# Patient Record
Sex: FEMALE | Race: OTHER | NOT HISPANIC OR LATINO | URBAN - METROPOLITAN AREA
[De-identification: names, ages, dates, MRNs, and addresses within clinical notes are randomized per-mention and may not be internally consistent; named-entity substitution may affect disease eponyms.]

---

## 2017-06-23 ENCOUNTER — OUTPATIENT (OUTPATIENT)
Dept: OUTPATIENT SERVICES | Facility: HOSPITAL | Age: 37
LOS: 1 days | Discharge: HOME | End: 2017-06-23
Payer: MEDICAID

## 2017-06-23 ENCOUNTER — RESULT REVIEW (OUTPATIENT)
Age: 37
End: 2017-06-23

## 2017-06-23 VITALS
HEART RATE: 91 BPM | TEMPERATURE: 98 F | SYSTOLIC BLOOD PRESSURE: 130 MMHG | HEIGHT: 64 IN | OXYGEN SATURATION: 96 % | DIASTOLIC BLOOD PRESSURE: 74 MMHG | RESPIRATION RATE: 21 BRPM | WEIGHT: 233.03 LBS

## 2017-06-23 LAB — HCG UR QL: NEGATIVE — SIGNIFICANT CHANGE UP

## 2017-06-23 PROCEDURE — 88312 SPECIAL STAINS GROUP 1: CPT | Mod: 26

## 2017-06-23 PROCEDURE — 88305 TISSUE EXAM BY PATHOLOGIST: CPT | Mod: 26

## 2017-06-23 RX ORDER — SODIUM CHLORIDE 9 MG/ML
1000 INJECTION INTRAMUSCULAR; INTRAVENOUS; SUBCUTANEOUS
Qty: 0 | Refills: 0 | Status: DISCONTINUED | OUTPATIENT
Start: 2017-06-23 | End: 2017-07-08

## 2017-06-23 RX ADMIN — SODIUM CHLORIDE 75 MILLILITER(S): 9 INJECTION INTRAMUSCULAR; INTRAVENOUS; SUBCUTANEOUS at 14:31

## 2017-06-28 LAB — SURGICAL PATHOLOGY FINAL REPORT - CH: SIGNIFICANT CHANGE UP

## 2017-06-29 DIAGNOSIS — K29.50 UNSPECIFIED CHRONIC GASTRITIS WITHOUT BLEEDING: ICD-10-CM

## 2017-06-29 DIAGNOSIS — E66.01 MORBID (SEVERE) OBESITY DUE TO EXCESS CALORIES: ICD-10-CM

## 2017-06-29 DIAGNOSIS — B96.81 HELICOBACTER PYLORI [H. PYLORI] AS THE CAUSE OF DISEASES CLASSIFIED ELSEWHERE: ICD-10-CM

## 2017-06-29 DIAGNOSIS — Z01.818 ENCOUNTER FOR OTHER PREPROCEDURAL EXAMINATION: ICD-10-CM

## 2017-06-29 DIAGNOSIS — Z87.891 PERSONAL HISTORY OF NICOTINE DEPENDENCE: ICD-10-CM

## 2017-08-24 ENCOUNTER — OUTPATIENT (OUTPATIENT)
Dept: OUTPATIENT SERVICES | Facility: HOSPITAL | Age: 37
LOS: 1 days | Discharge: HOME | End: 2017-08-24

## 2017-08-24 DIAGNOSIS — B96.81 HELICOBACTER PYLORI [H. PYLORI] AS THE CAUSE OF DISEASES CLASSIFIED ELSEWHERE: ICD-10-CM

## 2017-08-24 DIAGNOSIS — K91.2 POSTSURGICAL MALABSORPTION, NOT ELSEWHERE CLASSIFIED: ICD-10-CM

## 2017-08-24 DIAGNOSIS — E56.0 DEFICIENCY OF VITAMIN E: ICD-10-CM

## 2017-08-24 DIAGNOSIS — E55.9 VITAMIN D DEFICIENCY, UNSPECIFIED: ICD-10-CM

## 2017-08-24 DIAGNOSIS — D50.8 OTHER IRON DEFICIENCY ANEMIAS: ICD-10-CM

## 2017-08-24 DIAGNOSIS — R82.90 UNSPECIFIED ABNORMAL FINDINGS IN URINE: ICD-10-CM

## 2017-08-24 DIAGNOSIS — D53.9 NUTRITIONAL ANEMIA, UNSPECIFIED: ICD-10-CM

## 2017-08-24 DIAGNOSIS — E03.9 HYPOTHYROIDISM, UNSPECIFIED: ICD-10-CM

## 2017-08-24 DIAGNOSIS — E11.65 TYPE 2 DIABETES MELLITUS WITH HYPERGLYCEMIA: ICD-10-CM

## 2017-08-24 DIAGNOSIS — K21.9 GASTRO-ESOPHAGEAL REFLUX DISEASE WITHOUT ESOPHAGITIS: ICD-10-CM

## 2017-08-24 DIAGNOSIS — E66.01 MORBID (SEVERE) OBESITY DUE TO EXCESS CALORIES: ICD-10-CM

## 2017-08-24 DIAGNOSIS — E56.9 VITAMIN DEFICIENCY, UNSPECIFIED: ICD-10-CM

## 2017-08-24 DIAGNOSIS — E86.0 DEHYDRATION: ICD-10-CM

## 2017-08-24 DIAGNOSIS — Z98.84 BARIATRIC SURGERY STATUS: ICD-10-CM

## 2017-08-24 DIAGNOSIS — E78.5 HYPERLIPIDEMIA, UNSPECIFIED: ICD-10-CM

## 2017-08-24 DIAGNOSIS — E50.9 VITAMIN A DEFICIENCY, UNSPECIFIED: ICD-10-CM

## 2017-08-24 DIAGNOSIS — E56.1 DEFICIENCY OF VITAMIN K: ICD-10-CM

## 2017-08-24 DIAGNOSIS — D51.9 VITAMIN B12 DEFICIENCY ANEMIA, UNSPECIFIED: ICD-10-CM

## 2017-08-24 DIAGNOSIS — E21.3 HYPERPARATHYROIDISM, UNSPECIFIED: ICD-10-CM

## 2017-08-24 LAB
ALBUMIN SERPL ELPH-MCNC: 3.5 G/DL — SIGNIFICANT CHANGE UP (ref 3.3–5)
ALP SERPL-CCNC: 68 U/L — SIGNIFICANT CHANGE UP (ref 40–120)
ALT FLD-CCNC: 21 U/L — SIGNIFICANT CHANGE UP (ref 12–78)
ANION GAP SERPL CALC-SCNC: 6 MMOL/L — SIGNIFICANT CHANGE UP (ref 5–17)
APPEARANCE UR: CLEAR — SIGNIFICANT CHANGE UP
APTT BLD: 32.5 SEC — SIGNIFICANT CHANGE UP (ref 27.5–37.4)
AST SERPL-CCNC: 16 U/L — SIGNIFICANT CHANGE UP (ref 15–37)
BILIRUB DIRECT SERPL-MCNC: 0.2 MG/DL — SIGNIFICANT CHANGE UP (ref 0–0.2)
BILIRUB INDIRECT FLD-MCNC: 0.4 MG/DL — SIGNIFICANT CHANGE UP (ref 0.2–1)
BILIRUB SERPL-MCNC: 0.6 MG/DL — SIGNIFICANT CHANGE UP (ref 0.2–1.2)
BILIRUB UR-MCNC: NEGATIVE — SIGNIFICANT CHANGE UP
BUN SERPL-MCNC: 12 MG/DL — SIGNIFICANT CHANGE UP (ref 7–23)
CALCIUM SERPL-MCNC: 8.8 MG/DL — SIGNIFICANT CHANGE UP (ref 8.5–10.1)
CHLORIDE SERPL-SCNC: 108 MMOL/L — SIGNIFICANT CHANGE UP (ref 96–108)
CO2 SERPL-SCNC: 27 MMOL/L — SIGNIFICANT CHANGE UP (ref 22–31)
COHGB MFR BLDV: 1.6 % — HIGH (ref 0–1.5)
COLOR SPEC: YELLOW — SIGNIFICANT CHANGE UP
CREAT SERPL-MCNC: 0.69 MG/DL — SIGNIFICANT CHANGE UP (ref 0.5–1.3)
DIFF PNL FLD: NEGATIVE — SIGNIFICANT CHANGE UP
FERRITIN SERPL-MCNC: 29 NG/ML — SIGNIFICANT CHANGE UP (ref 15–150)
FOLATE SERPL-MCNC: 9.4 NG/ML — SIGNIFICANT CHANGE UP (ref 4.8–24.2)
GLUCOSE SERPL-MCNC: 86 MG/DL — SIGNIFICANT CHANGE UP (ref 70–99)
GLUCOSE UR QL: NEGATIVE MG/DL — SIGNIFICANT CHANGE UP
HBA1C BLD-MCNC: 5.4 % — SIGNIFICANT CHANGE UP (ref 4–5.6)
HCT VFR BLD CALC: 38.2 % — SIGNIFICANT CHANGE UP (ref 34.5–45)
HGB BLD-MCNC: 13.1 G/DL — SIGNIFICANT CHANGE UP (ref 11.5–15.5)
INR BLD: 0.99 RATIO — SIGNIFICANT CHANGE UP (ref 0.88–1.16)
KETONES UR-MCNC: NEGATIVE — SIGNIFICANT CHANGE UP
LEUKOCYTE ESTERASE UR-ACNC: NEGATIVE — SIGNIFICANT CHANGE UP
MAGNESIUM SERPL-MCNC: 2.2 MG/DL — SIGNIFICANT CHANGE UP (ref 1.6–2.6)
MCHC RBC-ENTMCNC: 29.5 PG — SIGNIFICANT CHANGE UP (ref 27–34)
MCHC RBC-ENTMCNC: 34.3 GM/DL — SIGNIFICANT CHANGE UP (ref 32–36)
MCV RBC AUTO: 86 FL — SIGNIFICANT CHANGE UP (ref 80–100)
NITRITE UR-MCNC: NEGATIVE — SIGNIFICANT CHANGE UP
PH UR: 7 — SIGNIFICANT CHANGE UP (ref 5–8)
PHOSPHATE SERPL-MCNC: 2.6 MG/DL — SIGNIFICANT CHANGE UP (ref 2.5–4.5)
PLATELET # BLD AUTO: 224 K/UL — SIGNIFICANT CHANGE UP (ref 150–400)
POTASSIUM SERPL-MCNC: 4 MMOL/L — SIGNIFICANT CHANGE UP (ref 3.5–5.3)
POTASSIUM SERPL-SCNC: 4 MMOL/L — SIGNIFICANT CHANGE UP (ref 3.5–5.3)
PROT SERPL-MCNC: 6.7 GM/DL — SIGNIFICANT CHANGE UP (ref 6–8.3)
PROT UR-MCNC: NEGATIVE MG/DL — SIGNIFICANT CHANGE UP
PROTHROM AB SERPL-ACNC: 10.7 SEC — SIGNIFICANT CHANGE UP (ref 9.8–12.7)
RBC # BLD: 4.45 M/UL — SIGNIFICANT CHANGE UP (ref 3.8–5.2)
RBC # FLD: 11.8 % — SIGNIFICANT CHANGE UP (ref 10.3–14.5)
SODIUM SERPL-SCNC: 141 MMOL/L — SIGNIFICANT CHANGE UP (ref 135–145)
SP GR SPEC: 1.01 — SIGNIFICANT CHANGE UP (ref 1.01–1.02)
T3 SERPL-MCNC: 147 NG/DL — SIGNIFICANT CHANGE UP (ref 80–200)
T4 AB SER-ACNC: 8.5 UG/DL — SIGNIFICANT CHANGE UP (ref 4.6–12)
TSH SERPL-MCNC: 1.06 UU/ML — SIGNIFICANT CHANGE UP (ref 0.36–3.74)
UROBILINOGEN FLD QL: 1 MG/DL
VIT B12 SERPL-MCNC: 619 PG/ML — SIGNIFICANT CHANGE UP (ref 243–894)
WBC # BLD: 7.6 K/UL — SIGNIFICANT CHANGE UP (ref 3.8–10.5)
WBC # FLD AUTO: 7.6 K/UL — SIGNIFICANT CHANGE UP (ref 3.8–10.5)

## 2017-08-25 LAB
24R-OH-CALCIDIOL SERPL-MCNC: 16.4 NG/ML — LOW (ref 30–100)
CHOLEST SERPL-MCNC: 150 MG/DL — SIGNIFICANT CHANGE UP (ref 10–199)
HDLC SERPL-MCNC: 42 MG/DL — SIGNIFICANT CHANGE UP (ref 40–125)
IRON SATN MFR SERPL: 16 % — SIGNIFICANT CHANGE UP (ref 14–50)
IRON SATN MFR SERPL: 54 UG/DL — SIGNIFICANT CHANGE UP (ref 30–160)
LIPID PNL WITH DIRECT LDL SERPL: 95 MG/DL — SIGNIFICANT CHANGE UP
TIBC SERPL-MCNC: 329 UG/DL — SIGNIFICANT CHANGE UP (ref 220–430)
TOTAL CHOLESTEROL/HDL RATIO MEASUREMENT: 3.6 RATIO — SIGNIFICANT CHANGE UP (ref 3.3–7.1)
TRIGL SERPL-MCNC: 65 MG/DL — SIGNIFICANT CHANGE UP (ref 10–149)
UIBC SERPL-MCNC: 275 UG/DL — SIGNIFICANT CHANGE UP (ref 110–370)

## 2017-08-27 LAB — VIT B1 SERPL-MCNC: 123.7 NMOL/L — SIGNIFICANT CHANGE UP (ref 66.5–200)

## 2017-09-26 ENCOUNTER — EMERGENCY (EMERGENCY)
Facility: HOSPITAL | Age: 37
LOS: 0 days | Discharge: HOME | End: 2017-09-26
Attending: EMERGENCY MEDICINE | Admitting: EMERGENCY MEDICINE
Payer: COMMERCIAL

## 2017-09-26 ENCOUNTER — APPOINTMENT (OUTPATIENT)
Dept: INTERNAL MEDICINE | Facility: CLINIC | Age: 37
End: 2017-09-26

## 2017-09-26 VITALS
RESPIRATION RATE: 16 BRPM | TEMPERATURE: 98 F | SYSTOLIC BLOOD PRESSURE: 139 MMHG | OXYGEN SATURATION: 100 % | WEIGHT: 190.04 LBS | HEART RATE: 75 BPM | DIASTOLIC BLOOD PRESSURE: 77 MMHG | HEIGHT: 62 IN

## 2017-09-26 VITALS
OXYGEN SATURATION: 100 % | DIASTOLIC BLOOD PRESSURE: 53 MMHG | RESPIRATION RATE: 18 BRPM | SYSTOLIC BLOOD PRESSURE: 95 MMHG | HEART RATE: 62 BPM | TEMPERATURE: 99 F

## 2017-09-26 LAB
ALBUMIN SERPL ELPH-MCNC: 3.8 G/DL — SIGNIFICANT CHANGE UP (ref 3.3–5)
ALP SERPL-CCNC: 73 U/L — SIGNIFICANT CHANGE UP (ref 40–120)
ALT FLD-CCNC: 19 U/L — SIGNIFICANT CHANGE UP (ref 12–78)
ANION GAP SERPL CALC-SCNC: 7 MMOL/L — SIGNIFICANT CHANGE UP (ref 5–17)
APPEARANCE UR: CLEAR — SIGNIFICANT CHANGE UP
AST SERPL-CCNC: 11 U/L — LOW (ref 15–37)
BACTERIA # UR AUTO: (no result)
BASOPHILS # BLD AUTO: 0.1 K/UL — SIGNIFICANT CHANGE UP (ref 0–0.2)
BASOPHILS NFR BLD AUTO: 0.8 % — SIGNIFICANT CHANGE UP (ref 0–2)
BILIRUB SERPL-MCNC: 0.6 MG/DL — SIGNIFICANT CHANGE UP (ref 0.2–1.2)
BILIRUB UR-MCNC: NEGATIVE — SIGNIFICANT CHANGE UP
BUN SERPL-MCNC: 13 MG/DL — SIGNIFICANT CHANGE UP (ref 7–23)
CALCIUM SERPL-MCNC: 8.9 MG/DL — SIGNIFICANT CHANGE UP (ref 8.5–10.1)
CHLORIDE SERPL-SCNC: 105 MMOL/L — SIGNIFICANT CHANGE UP (ref 96–108)
CO2 SERPL-SCNC: 27 MMOL/L — SIGNIFICANT CHANGE UP (ref 22–31)
COLOR SPEC: YELLOW — SIGNIFICANT CHANGE UP
CREAT SERPL-MCNC: 0.78 MG/DL — SIGNIFICANT CHANGE UP (ref 0.5–1.3)
DIFF PNL FLD: NEGATIVE — SIGNIFICANT CHANGE UP
EOSINOPHIL # BLD AUTO: 0 K/UL — SIGNIFICANT CHANGE UP (ref 0–0.5)
EOSINOPHIL NFR BLD AUTO: 0.1 % — SIGNIFICANT CHANGE UP (ref 0–6)
EPI CELLS # UR: SIGNIFICANT CHANGE UP
GLUCOSE SERPL-MCNC: 116 MG/DL — HIGH (ref 70–99)
GLUCOSE UR QL: NEGATIVE MG/DL — SIGNIFICANT CHANGE UP
HCT VFR BLD CALC: 41.5 % — SIGNIFICANT CHANGE UP (ref 34.5–45)
HGB BLD-MCNC: 13.8 G/DL — SIGNIFICANT CHANGE UP (ref 11.5–15.5)
KETONES UR-MCNC: (no result)
LEUKOCYTE ESTERASE UR-ACNC: (no result)
LIDOCAIN IGE QN: 82 U/L — SIGNIFICANT CHANGE UP (ref 73–393)
LYMPHOCYTES # BLD AUTO: 1.7 K/UL — SIGNIFICANT CHANGE UP (ref 1–3.3)
LYMPHOCYTES # BLD AUTO: 21.4 % — SIGNIFICANT CHANGE UP (ref 13–44)
MCHC RBC-ENTMCNC: 29.1 PG — SIGNIFICANT CHANGE UP (ref 27–34)
MCHC RBC-ENTMCNC: 33.2 GM/DL — SIGNIFICANT CHANGE UP (ref 32–36)
MCV RBC AUTO: 87.5 FL — SIGNIFICANT CHANGE UP (ref 80–100)
MONOCYTES # BLD AUTO: 0.7 K/UL — SIGNIFICANT CHANGE UP (ref 0–0.9)
MONOCYTES NFR BLD AUTO: 9 % — SIGNIFICANT CHANGE UP (ref 2–14)
NEUTROPHILS # BLD AUTO: 5.5 K/UL — SIGNIFICANT CHANGE UP (ref 1.8–7.4)
NEUTROPHILS NFR BLD AUTO: 68.7 % — SIGNIFICANT CHANGE UP (ref 43–77)
NITRITE UR-MCNC: NEGATIVE — SIGNIFICANT CHANGE UP
PH UR: 8 — SIGNIFICANT CHANGE UP (ref 5–8)
PLATELET # BLD AUTO: 259 K/UL — SIGNIFICANT CHANGE UP (ref 150–400)
POTASSIUM SERPL-MCNC: 3.7 MMOL/L — SIGNIFICANT CHANGE UP (ref 3.5–5.3)
POTASSIUM SERPL-SCNC: 3.7 MMOL/L — SIGNIFICANT CHANGE UP (ref 3.5–5.3)
PROT SERPL-MCNC: 7.3 GM/DL — SIGNIFICANT CHANGE UP (ref 6–8.3)
PROT UR-MCNC: 15 MG/DL
RBC # BLD: 4.74 M/UL — SIGNIFICANT CHANGE UP (ref 3.8–5.2)
RBC # FLD: 12.3 % — SIGNIFICANT CHANGE UP (ref 10.3–14.5)
RBC CASTS # UR COMP ASSIST: SIGNIFICANT CHANGE UP /HPF (ref 0–4)
SODIUM SERPL-SCNC: 139 MMOL/L — SIGNIFICANT CHANGE UP (ref 135–145)
SP GR SPEC: 1.01 — SIGNIFICANT CHANGE UP (ref 1.01–1.02)
UROBILINOGEN FLD QL: NEGATIVE MG/DL — SIGNIFICANT CHANGE UP
WBC # BLD: 8 K/UL — SIGNIFICANT CHANGE UP (ref 3.8–10.5)
WBC # FLD AUTO: 8 K/UL — SIGNIFICANT CHANGE UP (ref 3.8–10.5)
WBC UR QL: SIGNIFICANT CHANGE UP

## 2017-09-26 PROCEDURE — 99285 EMERGENCY DEPT VISIT HI MDM: CPT | Mod: 25

## 2017-09-26 PROCEDURE — 74177 CT ABD & PELVIS W/CONTRAST: CPT | Mod: 26

## 2017-09-26 RX ORDER — MORPHINE SULFATE 50 MG/1
4 CAPSULE, EXTENDED RELEASE ORAL ONCE
Qty: 0 | Refills: 0 | Status: DISCONTINUED | OUTPATIENT
Start: 2017-09-26 | End: 2017-09-26

## 2017-09-26 RX ORDER — SODIUM CHLORIDE 9 MG/ML
1000 INJECTION INTRAMUSCULAR; INTRAVENOUS; SUBCUTANEOUS ONCE
Qty: 0 | Refills: 0 | Status: COMPLETED | OUTPATIENT
Start: 2017-09-26 | End: 2017-09-26

## 2017-09-26 RX ORDER — ONDANSETRON 8 MG/1
4 TABLET, FILM COATED ORAL ONCE
Qty: 0 | Refills: 0 | Status: COMPLETED | OUTPATIENT
Start: 2017-09-26 | End: 2017-09-26

## 2017-09-26 RX ADMIN — ONDANSETRON 4 MILLIGRAM(S): 8 TABLET, FILM COATED ORAL at 11:13

## 2017-09-26 RX ADMIN — SODIUM CHLORIDE 1000 MILLILITER(S): 9 INJECTION INTRAMUSCULAR; INTRAVENOUS; SUBCUTANEOUS at 06:45

## 2017-09-26 RX ADMIN — MORPHINE SULFATE 4 MILLIGRAM(S): 50 CAPSULE, EXTENDED RELEASE ORAL at 11:12

## 2017-09-26 RX ADMIN — ONDANSETRON 4 MILLIGRAM(S): 8 TABLET, FILM COATED ORAL at 06:45

## 2017-09-26 RX ADMIN — MORPHINE SULFATE 4 MILLIGRAM(S): 50 CAPSULE, EXTENDED RELEASE ORAL at 13:37

## 2017-09-26 NOTE — ED ADULT NURSE REASSESSMENT NOTE - NS ED NURSE REASSESS COMMENT FT1
Pt medicated as per orders. Resting comfortably. VSS A+Ox3. Safety maintained. Updated on plan of care/awaiting CT results.

## 2017-09-26 NOTE — ED PROVIDER NOTE - OBJECTIVE STATEMENT
37 y/o female with no PMHx presents to the ED c/o frequent vomiting.  Pt states she was throwing up all day yesterday.  (+) right-sided abdominal pain in stomach preceding vomiting episode.  Denies recent travel.  Ate a burger at [employer] yesterday.  No fever, diarrhea, chills.  Smoker, social drinker.  LMP 2 weeks ago.  NKDA

## 2017-09-26 NOTE — ED PROVIDER NOTE - PROGRESS NOTE DETAILS
SO from Dr. Galarza- CT a/p pending, if negative reassess and can dc.  CT negative.  Repeat abdominal exam remains benign.  Pt denies any urinary sx.  Will await urine culture.  Wanting to go home.

## 2017-09-26 NOTE — ED ADULT NURSE NOTE - CHPI ED SYMPTOMS NEG
no diarrhea/no abdominal distension/no blood in stool/no fever/no dysuria/no burning urination/no chills/no hematuria

## 2017-09-26 NOTE — ED ADULT NURSE REASSESSMENT NOTE - NS ED NURSE REASSESS COMMENT FT1
Received pt resting in bed. VSS A+Ox3. Safety maintained. Call bell within reach. [place] provided. Pt updated on plan of care/awaiting urine sample and results for CT scan.

## 2017-09-26 NOTE — ED ADULT NURSE NOTE - OBJECTIVE STATEMENT
patient c/o worsening abdominal pain to RLQ for past day with multiple episodes of vomiting, vomiting at triage

## 2017-09-26 NOTE — ED PROVIDER NOTE - CONSTITUTIONAL, MLM
normal... Well appearing, overweight, awake, alert, oriented to person, place, time/situation and in no apparent distress.

## 2017-09-26 NOTE — ED PROVIDER NOTE - GASTROINTESTINAL, MLM
(+) focal tenderness to the RLQ, no guarding, rebound, peritoneal signs.  Negative Rovsing's sign.  Abdomen soft.

## 2017-09-27 ENCOUNTER — EMERGENCY (EMERGENCY)
Facility: HOSPITAL | Age: 37
LOS: 0 days | Discharge: HOME | End: 2017-09-27
Attending: EMERGENCY MEDICINE | Admitting: EMERGENCY MEDICINE
Payer: COMMERCIAL

## 2017-09-27 VITALS — WEIGHT: 225.97 LBS

## 2017-09-27 VITALS
DIASTOLIC BLOOD PRESSURE: 88 MMHG | TEMPERATURE: 98 F | OXYGEN SATURATION: 100 % | RESPIRATION RATE: 18 BRPM | HEART RATE: 65 BPM | SYSTOLIC BLOOD PRESSURE: 145 MMHG

## 2017-09-27 DIAGNOSIS — R11.10 VOMITING, UNSPECIFIED: ICD-10-CM

## 2017-09-27 DIAGNOSIS — R10.9 UNSPECIFIED ABDOMINAL PAIN: ICD-10-CM

## 2017-09-27 LAB
ALBUMIN SERPL ELPH-MCNC: 4.1 G/DL — SIGNIFICANT CHANGE UP (ref 3.3–5)
ALP SERPL-CCNC: 73 U/L — SIGNIFICANT CHANGE UP (ref 40–120)
ALT FLD-CCNC: 21 U/L — SIGNIFICANT CHANGE UP (ref 12–78)
ANION GAP SERPL CALC-SCNC: 7 MMOL/L — SIGNIFICANT CHANGE UP (ref 5–17)
AST SERPL-CCNC: 27 U/L — SIGNIFICANT CHANGE UP (ref 15–37)
BASOPHILS # BLD AUTO: 0.1 K/UL — SIGNIFICANT CHANGE UP (ref 0–0.2)
BASOPHILS NFR BLD AUTO: 0.9 % — SIGNIFICANT CHANGE UP (ref 0–2)
BILIRUB SERPL-MCNC: 0.7 MG/DL — SIGNIFICANT CHANGE UP (ref 0.2–1.2)
BUN SERPL-MCNC: 14 MG/DL — SIGNIFICANT CHANGE UP (ref 7–23)
CALCIUM SERPL-MCNC: 8.8 MG/DL — SIGNIFICANT CHANGE UP (ref 8.5–10.1)
CHLORIDE SERPL-SCNC: 102 MMOL/L — SIGNIFICANT CHANGE UP (ref 96–108)
CO2 SERPL-SCNC: 25 MMOL/L — SIGNIFICANT CHANGE UP (ref 22–31)
CREAT SERPL-MCNC: 0.87 MG/DL — SIGNIFICANT CHANGE UP (ref 0.5–1.3)
CULTURE RESULTS: SIGNIFICANT CHANGE UP
EOSINOPHIL # BLD AUTO: 0 K/UL — SIGNIFICANT CHANGE UP (ref 0–0.5)
EOSINOPHIL NFR BLD AUTO: 0.1 % — SIGNIFICANT CHANGE UP (ref 0–6)
GLUCOSE SERPL-MCNC: 105 MG/DL — HIGH (ref 70–99)
HCT VFR BLD CALC: 41.1 % — SIGNIFICANT CHANGE UP (ref 34.5–45)
HGB BLD-MCNC: 13.8 G/DL — SIGNIFICANT CHANGE UP (ref 11.5–15.5)
LYMPHOCYTES # BLD AUTO: 1.4 K/UL — SIGNIFICANT CHANGE UP (ref 1–3.3)
LYMPHOCYTES # BLD AUTO: 12.2 % — LOW (ref 13–44)
MCHC RBC-ENTMCNC: 29.2 PG — SIGNIFICANT CHANGE UP (ref 27–34)
MCHC RBC-ENTMCNC: 33.6 GM/DL — SIGNIFICANT CHANGE UP (ref 32–36)
MCV RBC AUTO: 86.9 FL — SIGNIFICANT CHANGE UP (ref 80–100)
MONOCYTES # BLD AUTO: 0.8 K/UL — SIGNIFICANT CHANGE UP (ref 0–0.9)
MONOCYTES NFR BLD AUTO: 7.2 % — SIGNIFICANT CHANGE UP (ref 2–14)
NEUTROPHILS # BLD AUTO: 8.9 K/UL — HIGH (ref 1.8–7.4)
NEUTROPHILS NFR BLD AUTO: 79.6 % — HIGH (ref 43–77)
PLATELET # BLD AUTO: 282 K/UL — SIGNIFICANT CHANGE UP (ref 150–400)
POTASSIUM SERPL-MCNC: 3.7 MMOL/L — SIGNIFICANT CHANGE UP (ref 3.5–5.3)
POTASSIUM SERPL-SCNC: 3.7 MMOL/L — SIGNIFICANT CHANGE UP (ref 3.5–5.3)
PROT SERPL-MCNC: 7.6 GM/DL — SIGNIFICANT CHANGE UP (ref 6–8.3)
RBC # BLD: 4.73 M/UL — SIGNIFICANT CHANGE UP (ref 3.8–5.2)
RBC # FLD: 11.8 % — SIGNIFICANT CHANGE UP (ref 10.3–14.5)
SODIUM SERPL-SCNC: 134 MMOL/L — LOW (ref 135–145)
SPECIMEN SOURCE: SIGNIFICANT CHANGE UP
WBC # BLD: 11.2 K/UL — HIGH (ref 3.8–10.5)
WBC # FLD AUTO: 11.2 K/UL — HIGH (ref 3.8–10.5)

## 2017-09-27 PROCEDURE — 99285 EMERGENCY DEPT VISIT HI MDM: CPT

## 2017-09-27 RX ORDER — KETOROLAC TROMETHAMINE 30 MG/ML
30 SYRINGE (ML) INJECTION ONCE
Qty: 0 | Refills: 0 | Status: DISCONTINUED | OUTPATIENT
Start: 2017-09-27 | End: 2017-09-27

## 2017-09-27 RX ORDER — ONDANSETRON 8 MG/1
4 TABLET, FILM COATED ORAL ONCE
Qty: 0 | Refills: 0 | Status: COMPLETED | OUTPATIENT
Start: 2017-09-27 | End: 2017-09-27

## 2017-09-27 RX ORDER — FAMOTIDINE 10 MG/ML
20 INJECTION INTRAVENOUS ONCE
Qty: 0 | Refills: 0 | Status: COMPLETED | OUTPATIENT
Start: 2017-09-27 | End: 2017-09-27

## 2017-09-27 RX ORDER — ACETAMINOPHEN 500 MG
1000 TABLET ORAL ONCE
Qty: 0 | Refills: 0 | Status: COMPLETED | OUTPATIENT
Start: 2017-09-27 | End: 2017-09-27

## 2017-09-27 RX ORDER — ONDANSETRON 8 MG/1
1 TABLET, FILM COATED ORAL
Qty: 12 | Refills: 0 | OUTPATIENT
Start: 2017-09-27

## 2017-09-27 RX ORDER — SODIUM CHLORIDE 9 MG/ML
1000 INJECTION INTRAMUSCULAR; INTRAVENOUS; SUBCUTANEOUS ONCE
Qty: 0 | Refills: 0 | Status: COMPLETED | OUTPATIENT
Start: 2017-09-27 | End: 2017-09-27

## 2017-09-27 RX ADMIN — SODIUM CHLORIDE 1000 MILLILITER(S): 9 INJECTION INTRAMUSCULAR; INTRAVENOUS; SUBCUTANEOUS at 17:31

## 2017-09-27 RX ADMIN — ONDANSETRON 4 MILLIGRAM(S): 8 TABLET, FILM COATED ORAL at 19:01

## 2017-09-27 RX ADMIN — ONDANSETRON 4 MILLIGRAM(S): 8 TABLET, FILM COATED ORAL at 19:59

## 2017-09-27 RX ADMIN — FAMOTIDINE 20 MILLIGRAM(S): 10 INJECTION INTRAVENOUS at 20:04

## 2017-09-27 RX ADMIN — Medication 1000 MILLIGRAM(S): at 18:05

## 2017-09-27 RX ADMIN — Medication 30 MILLIGRAM(S): at 19:59

## 2017-09-27 NOTE — ED STATDOCS - OBJECTIVE STATEMENT
37 yo female presents with abd pain, vomiting, and dizziness, persistent since Sunday. Pt was seen for the same symptoms yesterday and discharged after a negative workup. This morning she felt fine but while she was at work, she started having abd pain and throwing up again. Also states she felt dizzy and her legs were shaky. Denies headache, diarrhea, fever.

## 2017-09-27 NOTE — ED ADULT TRIAGE NOTE - CHIEF COMPLAINT QUOTE
Pt presents to ED c/o feeling dizzy, abd pain and vomiting. Pt was seen and discharged from [facility] yesterday for same s/s

## 2017-09-27 NOTE — ED ADULT NURSE NOTE - CHIEF COMPLAINT QUOTE
Pt presents to ED c/o feeling dizzy, abd pain and vomiting. Pt was seen and discharged from [facility] yesterday for same s/s

## 2017-09-27 NOTE — ED STATDOCS - ATTENDING CONTRIBUTION TO CARE
I, Madina Holbrook MD,  performed the initial face to face bedside interview with this patient regarding history of present illness, review of symptoms and relevant past medical, social and family history.  I completed an independent physical examination.  I was the initial provider who evaluated this patient. I have signed out the follow up of any pending tests (i.e. labs, radiological studies) to the ACP.  I have communicated the patient’s plan of care and disposition with the ACP.  The history, relevant review of systems, past medical and surgical history, medical decision making, and physical examination was documented by the scribe in my presence and I attest to the accuracy of the documentation.

## 2017-09-27 NOTE — ED STATDOCS - PROGRESS NOTE DETAILS
Patient seen and evaluated, ED attending note and orders reviewed, will continue with patient follow up and care -Kelley Bautista PA-C Patient still feeling some nausea and pain, labs with no acute findings, slightly elevated WBC is likely a stress response from vomiting.  Will send rx for zofran, rec GI f/u and reviewed return precautions -Kelley Bautista PA-C

## 2017-09-27 NOTE — ED STATDOCS - MEDICAL DECISION MAKING DETAILS
Pt with persistent abd pain and vomiting, was seen and discharged from here yesterday. Plan IVF and labs.

## 2017-09-27 NOTE — ED ADULT NURSE REASSESSMENT NOTE - NS ED NURSE REASSESS COMMENT FT1
Patient refusing to sit in RW recliners at this time due to pain level. Patient brought to stretcher in supertrack. Tiesha roberts.

## 2017-09-28 ENCOUNTER — APPOINTMENT (OUTPATIENT)
Dept: INTERNAL MEDICINE | Facility: CLINIC | Age: 37
End: 2017-09-28
Payer: COMMERCIAL

## 2017-09-28 ENCOUNTER — EMERGENCY (EMERGENCY)
Facility: HOSPITAL | Age: 37
LOS: 0 days | Discharge: HOME | End: 2017-09-29
Attending: EMERGENCY MEDICINE | Admitting: EMERGENCY MEDICINE
Payer: COMMERCIAL

## 2017-09-28 VITALS
TEMPERATURE: 98 F | WEIGHT: 225.97 LBS | DIASTOLIC BLOOD PRESSURE: 80 MMHG | OXYGEN SATURATION: 100 % | HEART RATE: 65 BPM | SYSTOLIC BLOOD PRESSURE: 130 MMHG | HEIGHT: 55 IN | RESPIRATION RATE: 18 BRPM

## 2017-09-28 VITALS
OXYGEN SATURATION: 97 % | DIASTOLIC BLOOD PRESSURE: 80 MMHG | BODY MASS INDEX: 37.73 KG/M2 | HEART RATE: 74 BPM | RESPIRATION RATE: 16 BRPM | SYSTOLIC BLOOD PRESSURE: 124 MMHG | HEIGHT: 64 IN | WEIGHT: 221 LBS | TEMPERATURE: 98.2 F

## 2017-09-28 DIAGNOSIS — G47.33 OBSTRUCTIVE SLEEP APNEA (ADULT) (PEDIATRIC): ICD-10-CM

## 2017-09-28 DIAGNOSIS — E66.9 OBESITY, UNSPECIFIED: ICD-10-CM

## 2017-09-28 DIAGNOSIS — R06.00 DYSPNEA, UNSPECIFIED: ICD-10-CM

## 2017-09-28 PROCEDURE — 99204 OFFICE O/P NEW MOD 45 MIN: CPT | Mod: 25

## 2017-09-28 PROCEDURE — ZZZZZ: CPT

## 2017-09-28 PROCEDURE — 94727 GAS DIL/WSHOT DETER LNG VOL: CPT

## 2017-09-28 PROCEDURE — 94060 EVALUATION OF WHEEZING: CPT

## 2017-09-28 PROCEDURE — 94729 DIFFUSING CAPACITY: CPT

## 2017-09-28 PROCEDURE — 99284 EMERGENCY DEPT VISIT MOD MDM: CPT | Mod: 25

## 2017-09-28 PROCEDURE — G0447 BEHAVIOR COUNSEL OBESITY 15M: CPT

## 2017-09-29 VITALS
HEART RATE: 67 BPM | SYSTOLIC BLOOD PRESSURE: 131 MMHG | DIASTOLIC BLOOD PRESSURE: 82 MMHG | RESPIRATION RATE: 17 BRPM | OXYGEN SATURATION: 100 % | TEMPERATURE: 98 F

## 2017-09-29 DIAGNOSIS — F17.200 NICOTINE DEPENDENCE, UNSPECIFIED, UNCOMPLICATED: ICD-10-CM

## 2017-09-29 DIAGNOSIS — R11.2 NAUSEA WITH VOMITING, UNSPECIFIED: ICD-10-CM

## 2017-09-29 DIAGNOSIS — A08.4 VIRAL INTESTINAL INFECTION, UNSPECIFIED: ICD-10-CM

## 2017-09-29 DIAGNOSIS — R10.9 UNSPECIFIED ABDOMINAL PAIN: ICD-10-CM

## 2017-09-29 DIAGNOSIS — R11.10 VOMITING, UNSPECIFIED: ICD-10-CM

## 2017-09-29 LAB
ALBUMIN SERPL ELPH-MCNC: 4.2 G/DL — SIGNIFICANT CHANGE UP (ref 3.3–5)
ALP SERPL-CCNC: 70 U/L — SIGNIFICANT CHANGE UP (ref 40–120)
ALT FLD-CCNC: 22 U/L — SIGNIFICANT CHANGE UP (ref 12–78)
ANION GAP SERPL CALC-SCNC: 9 MMOL/L — SIGNIFICANT CHANGE UP (ref 5–17)
AST SERPL-CCNC: 20 U/L — SIGNIFICANT CHANGE UP (ref 15–37)
BASOPHILS # BLD AUTO: 0.1 K/UL — SIGNIFICANT CHANGE UP (ref 0–0.2)
BASOPHILS NFR BLD AUTO: 0.9 % — SIGNIFICANT CHANGE UP (ref 0–2)
BILIRUB SERPL-MCNC: 0.7 MG/DL — SIGNIFICANT CHANGE UP (ref 0.2–1.2)
BUN SERPL-MCNC: 16 MG/DL — SIGNIFICANT CHANGE UP (ref 7–23)
CALCIUM SERPL-MCNC: 8.9 MG/DL — SIGNIFICANT CHANGE UP (ref 8.5–10.1)
CHLORIDE SERPL-SCNC: 105 MMOL/L — SIGNIFICANT CHANGE UP (ref 96–108)
CO2 SERPL-SCNC: 24 MMOL/L — SIGNIFICANT CHANGE UP (ref 22–31)
CREAT SERPL-MCNC: 0.92 MG/DL — SIGNIFICANT CHANGE UP (ref 0.5–1.3)
EOSINOPHIL # BLD AUTO: 0 K/UL — SIGNIFICANT CHANGE UP (ref 0–0.5)
EOSINOPHIL NFR BLD AUTO: 0.1 % — SIGNIFICANT CHANGE UP (ref 0–6)
GLUCOSE SERPL-MCNC: 108 MG/DL — HIGH (ref 70–99)
HCT VFR BLD CALC: 40.7 % — SIGNIFICANT CHANGE UP (ref 34.5–45)
HGB BLD-MCNC: 14.2 G/DL — SIGNIFICANT CHANGE UP (ref 11.5–15.5)
LIDOCAIN IGE QN: 84 U/L — SIGNIFICANT CHANGE UP (ref 73–393)
LYMPHOCYTES # BLD AUTO: 16.8 % — SIGNIFICANT CHANGE UP (ref 13–44)
LYMPHOCYTES # BLD AUTO: 2.1 K/UL — SIGNIFICANT CHANGE UP (ref 1–3.3)
MCHC RBC-ENTMCNC: 30.4 PG — SIGNIFICANT CHANGE UP (ref 27–34)
MCHC RBC-ENTMCNC: 34.9 GM/DL — SIGNIFICANT CHANGE UP (ref 32–36)
MCV RBC AUTO: 87.1 FL — SIGNIFICANT CHANGE UP (ref 80–100)
MONOCYTES # BLD AUTO: 0.9 K/UL — SIGNIFICANT CHANGE UP (ref 0–0.9)
MONOCYTES NFR BLD AUTO: 6.9 % — SIGNIFICANT CHANGE UP (ref 2–14)
NEUTROPHILS # BLD AUTO: 9.5 K/UL — HIGH (ref 1.8–7.4)
NEUTROPHILS NFR BLD AUTO: 75.3 % — SIGNIFICANT CHANGE UP (ref 43–77)
PLATELET # BLD AUTO: 262 K/UL — SIGNIFICANT CHANGE UP (ref 150–400)
POTASSIUM SERPL-MCNC: 3.8 MMOL/L — SIGNIFICANT CHANGE UP (ref 3.5–5.3)
POTASSIUM SERPL-SCNC: 3.8 MMOL/L — SIGNIFICANT CHANGE UP (ref 3.5–5.3)
PROT SERPL-MCNC: 7.4 GM/DL — SIGNIFICANT CHANGE UP (ref 6–8.3)
RBC # BLD: 4.67 M/UL — SIGNIFICANT CHANGE UP (ref 3.8–5.2)
RBC # FLD: 11.9 % — SIGNIFICANT CHANGE UP (ref 10.3–14.5)
SODIUM SERPL-SCNC: 138 MMOL/L — SIGNIFICANT CHANGE UP (ref 135–145)
WBC # BLD: 12.6 K/UL — HIGH (ref 3.8–10.5)
WBC # FLD AUTO: 12.6 K/UL — HIGH (ref 3.8–10.5)

## 2017-09-29 PROCEDURE — 76705 ECHO EXAM OF ABDOMEN: CPT | Mod: 26

## 2017-09-29 RX ORDER — SODIUM CHLORIDE 9 MG/ML
1000 INJECTION INTRAMUSCULAR; INTRAVENOUS; SUBCUTANEOUS ONCE
Qty: 0 | Refills: 0 | Status: COMPLETED | OUTPATIENT
Start: 2017-09-29 | End: 2017-09-29

## 2017-09-29 RX ORDER — ONDANSETRON 8 MG/1
8 TABLET, FILM COATED ORAL ONCE
Qty: 0 | Refills: 0 | Status: COMPLETED | OUTPATIENT
Start: 2017-09-29 | End: 2017-09-29

## 2017-09-29 RX ORDER — MORPHINE SULFATE 50 MG/1
4 CAPSULE, EXTENDED RELEASE ORAL ONCE
Qty: 0 | Refills: 0 | Status: DISCONTINUED | OUTPATIENT
Start: 2017-09-29 | End: 2017-09-29

## 2017-09-29 RX ORDER — PANTOPRAZOLE SODIUM 20 MG/1
40 TABLET, DELAYED RELEASE ORAL ONCE
Qty: 0 | Refills: 0 | Status: COMPLETED | OUTPATIENT
Start: 2017-09-29 | End: 2017-09-29

## 2017-09-29 RX ORDER — OMEPRAZOLE 10 MG/1
1 CAPSULE, DELAYED RELEASE ORAL
Qty: 14 | Refills: 0 | OUTPATIENT
Start: 2017-09-29 | End: 2017-10-13

## 2017-09-29 RX ORDER — SODIUM CHLORIDE 9 MG/ML
3 INJECTION INTRAMUSCULAR; INTRAVENOUS; SUBCUTANEOUS ONCE
Qty: 0 | Refills: 0 | Status: COMPLETED | OUTPATIENT
Start: 2017-09-29 | End: 2017-09-29

## 2017-09-29 RX ORDER — KETOROLAC TROMETHAMINE 30 MG/ML
30 SYRINGE (ML) INJECTION ONCE
Qty: 0 | Refills: 0 | Status: DISCONTINUED | OUTPATIENT
Start: 2017-09-29 | End: 2017-09-29

## 2017-09-29 RX ADMIN — ONDANSETRON 8 MILLIGRAM(S): 8 TABLET, FILM COATED ORAL at 01:30

## 2017-09-29 RX ADMIN — Medication 30 MILLIGRAM(S): at 04:57

## 2017-09-29 RX ADMIN — SODIUM CHLORIDE 3 MILLILITER(S): 9 INJECTION INTRAMUSCULAR; INTRAVENOUS; SUBCUTANEOUS at 01:31

## 2017-09-29 RX ADMIN — SODIUM CHLORIDE 1000 MILLILITER(S): 9 INJECTION INTRAMUSCULAR; INTRAVENOUS; SUBCUTANEOUS at 01:31

## 2017-09-29 RX ADMIN — MORPHINE SULFATE 4 MILLIGRAM(S): 50 CAPSULE, EXTENDED RELEASE ORAL at 01:30

## 2017-09-29 RX ADMIN — Medication 30 MILLILITER(S): at 04:57

## 2017-09-29 RX ADMIN — PANTOPRAZOLE SODIUM 40 MILLIGRAM(S): 20 TABLET, DELAYED RELEASE ORAL at 01:30

## 2017-09-29 RX ADMIN — SODIUM CHLORIDE 1000 MILLILITER(S): 9 INJECTION INTRAMUSCULAR; INTRAVENOUS; SUBCUTANEOUS at 04:57

## 2017-09-29 NOTE — ED ADULT NURSE REASSESSMENT NOTE - NS ED NURSE REASSESS COMMENT FT1
patient resting in bed. sleeping. 2nd liter IV fluid bolus still infusing. patient to be discharged home upon completion of 2nd iv fluid bolus.
patient discharged home. written and verbal discharge, prescription, and followup instructions given to patient, patient verbalized back understanding. lab and ultrasound results given to patient. iv taken out. additional verbal instructions given. awaiting ride home from family member.

## 2017-09-29 NOTE — ED PROVIDER NOTE - MEDICAL DECISION MAKING DETAILS
35 yo female with persistent abdominal pain, N/V. Seen in ER twice in last two days and had negative workups. Plan IVF, labs, zofran, protonix, US abd.

## 2017-09-29 NOTE — ED ADULT NURSE NOTE - OBJECTIVE STATEMENT
4 days diffuse abdominal pain, nausea, vomiting. states that symptoms have not improved for past 4 days. was seen yesterday and day before for same, states she continues to have episodes of vomiting after being discharged home.

## 2017-09-29 NOTE — ED PROVIDER NOTE - OBJECTIVE STATEMENT
37 yo otherwise healthy female presents with 4 days of diffuse abd pain, nausea, vomiting.  Has difficulty eating without vomiting.  Was seen here yesterday and the day before for same, labs and CT abd were negative.  Denies dysuria, hematuria, back pain, fever.

## 2017-09-29 NOTE — ED PROVIDER NOTE - CARE PLAN
Principal Discharge DX:	Viral gastritis  Secondary Diagnosis:	Nausea and vomiting, intractability of vomiting not specified, unspecified vomiting type

## 2017-10-09 ENCOUNTER — OUTPATIENT (OUTPATIENT)
Dept: OUTPATIENT SERVICES | Facility: HOSPITAL | Age: 37
LOS: 1 days | Discharge: HOME | End: 2017-10-09
Payer: COMMERCIAL

## 2017-10-09 VITALS
RESPIRATION RATE: 20 BRPM | HEART RATE: 96 BPM | DIASTOLIC BLOOD PRESSURE: 80 MMHG | TEMPERATURE: 98 F | OXYGEN SATURATION: 100 % | WEIGHT: 227.08 LBS | HEIGHT: 64 IN | SYSTOLIC BLOOD PRESSURE: 130 MMHG

## 2017-10-09 DIAGNOSIS — E66.01 MORBID (SEVERE) OBESITY DUE TO EXCESS CALORIES: ICD-10-CM

## 2017-10-09 DIAGNOSIS — Z98.890 OTHER SPECIFIED POSTPROCEDURAL STATES: Chronic | ICD-10-CM

## 2017-10-09 LAB
ALBUMIN SERPL ELPH-MCNC: 3.5 G/DL — SIGNIFICANT CHANGE UP (ref 3.3–5)
ALLERGY+IMMUNOLOGY DIAG STUDY NOTE: SIGNIFICANT CHANGE UP
ANION GAP SERPL CALC-SCNC: 6 MMOL/L — SIGNIFICANT CHANGE UP (ref 5–17)
APPEARANCE UR: CLEAR — SIGNIFICANT CHANGE UP
APTT BLD: 31.9 SEC — SIGNIFICANT CHANGE UP (ref 27.5–37.4)
BASOPHILS # BLD AUTO: 0.1 K/UL — SIGNIFICANT CHANGE UP (ref 0–0.2)
BASOPHILS NFR BLD AUTO: 0.9 % — SIGNIFICANT CHANGE UP (ref 0–2)
BILIRUB UR-MCNC: NEGATIVE — SIGNIFICANT CHANGE UP
BUN SERPL-MCNC: 17 MG/DL — SIGNIFICANT CHANGE UP (ref 7–23)
CALCIUM SERPL-MCNC: 8.7 MG/DL — SIGNIFICANT CHANGE UP (ref 8.5–10.1)
CHLORIDE SERPL-SCNC: 106 MMOL/L — SIGNIFICANT CHANGE UP (ref 96–108)
CO2 SERPL-SCNC: 26 MMOL/L — SIGNIFICANT CHANGE UP (ref 22–31)
COHGB MFR BLDV: 1.6 % — HIGH (ref 0–1.5)
COLOR SPEC: YELLOW — SIGNIFICANT CHANGE UP
CREAT SERPL-MCNC: 0.64 MG/DL — SIGNIFICANT CHANGE UP (ref 0.5–1.3)
DIFF PNL FLD: NEGATIVE — SIGNIFICANT CHANGE UP
EOSINOPHIL # BLD AUTO: 0.1 K/UL — SIGNIFICANT CHANGE UP (ref 0–0.5)
EOSINOPHIL NFR BLD AUTO: 1 % — SIGNIFICANT CHANGE UP (ref 0–6)
GLUCOSE SERPL-MCNC: 88 MG/DL — SIGNIFICANT CHANGE UP (ref 70–99)
GLUCOSE UR QL: NEGATIVE MG/DL — SIGNIFICANT CHANGE UP
HCT VFR BLD CALC: 39.9 % — SIGNIFICANT CHANGE UP (ref 34.5–45)
HGB BLD-MCNC: 13.2 G/DL — SIGNIFICANT CHANGE UP (ref 11.5–15.5)
HIV 1 & 2 AB SERPL IA.RAPID: SIGNIFICANT CHANGE UP
INR BLD: 0.99 RATIO — SIGNIFICANT CHANGE UP (ref 0.88–1.16)
KETONES UR-MCNC: NEGATIVE — SIGNIFICANT CHANGE UP
LEUKOCYTE ESTERASE UR-ACNC: NEGATIVE — SIGNIFICANT CHANGE UP
LYMPHOCYTES # BLD AUTO: 1.7 K/UL — SIGNIFICANT CHANGE UP (ref 1–3.3)
LYMPHOCYTES # BLD AUTO: 19.8 % — SIGNIFICANT CHANGE UP (ref 13–44)
MCHC RBC-ENTMCNC: 28.9 PG — SIGNIFICANT CHANGE UP (ref 27–34)
MCHC RBC-ENTMCNC: 33.1 GM/DL — SIGNIFICANT CHANGE UP (ref 32–36)
MCV RBC AUTO: 87.4 FL — SIGNIFICANT CHANGE UP (ref 80–100)
MONOCYTES # BLD AUTO: 0.9 K/UL — SIGNIFICANT CHANGE UP (ref 0–0.9)
MONOCYTES NFR BLD AUTO: 10.7 % — SIGNIFICANT CHANGE UP (ref 2–14)
NEUTROPHILS # BLD AUTO: 6 K/UL — SIGNIFICANT CHANGE UP (ref 1.8–7.4)
NEUTROPHILS NFR BLD AUTO: 67.6 % — SIGNIFICANT CHANGE UP (ref 43–77)
NITRITE UR-MCNC: NEGATIVE — SIGNIFICANT CHANGE UP
PH UR: 6 — SIGNIFICANT CHANGE UP (ref 5–8)
PLATELET # BLD AUTO: 266 K/UL — SIGNIFICANT CHANGE UP (ref 150–400)
POTASSIUM SERPL-MCNC: 4 MMOL/L — SIGNIFICANT CHANGE UP (ref 3.5–5.3)
POTASSIUM SERPL-SCNC: 4 MMOL/L — SIGNIFICANT CHANGE UP (ref 3.5–5.3)
PROT UR-MCNC: NEGATIVE MG/DL — SIGNIFICANT CHANGE UP
PROTHROM AB SERPL-ACNC: 10.7 SEC — SIGNIFICANT CHANGE UP (ref 9.8–12.7)
RBC # BLD: 4.56 M/UL — SIGNIFICANT CHANGE UP (ref 3.8–5.2)
RBC # FLD: 12.1 % — SIGNIFICANT CHANGE UP (ref 10.3–14.5)
SODIUM SERPL-SCNC: 138 MMOL/L — SIGNIFICANT CHANGE UP (ref 135–145)
SP GR SPEC: 1.01 — SIGNIFICANT CHANGE UP (ref 1.01–1.02)
UROBILINOGEN FLD QL: NEGATIVE MG/DL — SIGNIFICANT CHANGE UP
WBC # BLD: 8.8 K/UL — SIGNIFICANT CHANGE UP (ref 3.8–10.5)
WBC # FLD AUTO: 8.8 K/UL — SIGNIFICANT CHANGE UP (ref 3.8–10.5)

## 2017-10-09 PROCEDURE — 71020: CPT | Mod: 26

## 2017-10-09 PROCEDURE — 93010 ELECTROCARDIOGRAM REPORT: CPT

## 2017-10-09 NOTE — H&P PST ADULT - ASSESSMENT
37 y/o female with morbid obesity, GERD. Pt tried different weight loss management like diet and exercise without success. Scheduled for Laparoscopic sleeve gastrectomy, possible open, liver biopsy, intra operative endoscopy with Dr. Melchor.    Plan  1. Stop all NSAIDS, herbal supplements and vitamins for 7 days.  2. NPO at midnight.  3. Use EZ sponges as directed

## 2017-10-09 NOTE — H&P PST ADULT - FAMILY HISTORY
Mother  Still living? Unknown  Family history of thyroid disease, Age at diagnosis: Age Unknown     Grandparent  Still living? Unknown  Family history of cerebrovascular accident (CVA) in grandmother, Age at diagnosis: Age Unknown     Aunt  Still living? Unknown  Family history of lupus erythematosus, Age at diagnosis: Age Unknown

## 2017-10-09 NOTE — H&P PST ADULT - HISTORY OF PRESENT ILLNESS
37 y/o female with morbid obesity, GERD. Pt tried different weight loss management like diet and exercise without success. Here today for PST for sleeve gastrectomy.

## 2017-10-09 NOTE — H&P PST ADULT - NSANTHOSAYNRD_GEN_A_CORE
No. LORIE screening performed.  STOP BANG Legend: 0-2 = LOW Risk; 3-4 = INTERMEDIATE Risk; 5-8 = HIGH Risk

## 2017-10-17 RX ORDER — HYDROMORPHONE HYDROCHLORIDE 2 MG/ML
30 INJECTION INTRAMUSCULAR; INTRAVENOUS; SUBCUTANEOUS
Qty: 0 | Refills: 0 | Status: DISCONTINUED | OUTPATIENT
Start: 2017-10-18 | End: 2017-10-19

## 2017-10-17 RX ORDER — NALOXONE HYDROCHLORIDE 4 MG/.1ML
0.1 SPRAY NASAL
Qty: 0 | Refills: 0 | Status: DISCONTINUED | OUTPATIENT
Start: 2017-10-18 | End: 2017-10-20

## 2017-10-17 RX ORDER — ONDANSETRON 8 MG/1
4 TABLET, FILM COATED ORAL EVERY 6 HOURS
Qty: 0 | Refills: 0 | Status: DISCONTINUED | OUTPATIENT
Start: 2017-10-18 | End: 2017-10-20

## 2017-10-17 RX ORDER — APREPITANT 80 MG/1
40 CAPSULE ORAL ONCE
Qty: 0 | Refills: 0 | Status: COMPLETED | OUTPATIENT
Start: 2017-10-18 | End: 2017-10-18

## 2017-10-17 RX ORDER — OXYCODONE HYDROCHLORIDE 5 MG/1
10 TABLET ORAL ONCE
Qty: 0 | Refills: 0 | Status: DISCONTINUED | OUTPATIENT
Start: 2017-10-18 | End: 2017-10-18

## 2017-10-17 RX ORDER — ONDANSETRON 8 MG/1
4 TABLET, FILM COATED ORAL ONCE
Qty: 0 | Refills: 0 | Status: COMPLETED | OUTPATIENT
Start: 2017-10-18 | End: 2017-10-18

## 2017-10-17 RX ORDER — HYDROMORPHONE HYDROCHLORIDE 2 MG/ML
0.5 INJECTION INTRAMUSCULAR; INTRAVENOUS; SUBCUTANEOUS
Qty: 0 | Refills: 0 | Status: DISCONTINUED | OUTPATIENT
Start: 2017-10-18 | End: 2017-10-19

## 2017-10-18 ENCOUNTER — INPATIENT (INPATIENT)
Facility: HOSPITAL | Age: 37
LOS: 1 days | Discharge: HOME | End: 2017-10-20
Attending: SURGERY | Admitting: SURGERY
Payer: COMMERCIAL

## 2017-10-18 ENCOUNTER — RESULT REVIEW (OUTPATIENT)
Age: 37
End: 2017-10-18

## 2017-10-18 VITALS
SYSTOLIC BLOOD PRESSURE: 110 MMHG | RESPIRATION RATE: 16 BRPM | DIASTOLIC BLOOD PRESSURE: 65 MMHG | OXYGEN SATURATION: 98 % | TEMPERATURE: 97 F | WEIGHT: 227.08 LBS | HEART RATE: 80 BPM | HEIGHT: 64 IN

## 2017-10-18 DIAGNOSIS — Z98.84 BARIATRIC SURGERY STATUS: Chronic | ICD-10-CM

## 2017-10-18 DIAGNOSIS — Z98.890 OTHER SPECIFIED POSTPROCEDURAL STATES: Chronic | ICD-10-CM

## 2017-10-18 LAB
ANION GAP SERPL CALC-SCNC: 10 MMOL/L — SIGNIFICANT CHANGE UP (ref 5–17)
BASOPHILS # BLD AUTO: 0 K/UL — SIGNIFICANT CHANGE UP (ref 0–0.2)
BASOPHILS NFR BLD AUTO: 0.1 % — SIGNIFICANT CHANGE UP (ref 0–2)
BUN SERPL-MCNC: 15 MG/DL — SIGNIFICANT CHANGE UP (ref 7–23)
CALCIUM SERPL-MCNC: 8.4 MG/DL — LOW (ref 8.5–10.1)
CHLORIDE SERPL-SCNC: 106 MMOL/L — SIGNIFICANT CHANGE UP (ref 96–108)
CO2 SERPL-SCNC: 23 MMOL/L — SIGNIFICANT CHANGE UP (ref 22–31)
CREAT SERPL-MCNC: 1.01 MG/DL — SIGNIFICANT CHANGE UP (ref 0.5–1.3)
EOSINOPHIL # BLD AUTO: 0 K/UL — SIGNIFICANT CHANGE UP (ref 0–0.5)
EOSINOPHIL NFR BLD AUTO: 0 % — SIGNIFICANT CHANGE UP (ref 0–6)
GLUCOSE SERPL-MCNC: 137 MG/DL — HIGH (ref 70–99)
HCG UR QL: NEGATIVE — SIGNIFICANT CHANGE UP
HCT VFR BLD CALC: 37.8 % — SIGNIFICANT CHANGE UP (ref 34.5–45)
HGB BLD-MCNC: 12.6 G/DL — SIGNIFICANT CHANGE UP (ref 11.5–15.5)
LYMPHOCYTES # BLD AUTO: 0.7 K/UL — LOW (ref 1–3.3)
LYMPHOCYTES # BLD AUTO: 4 % — LOW (ref 13–44)
MCHC RBC-ENTMCNC: 29 PG — SIGNIFICANT CHANGE UP (ref 27–34)
MCHC RBC-ENTMCNC: 33.1 GM/DL — SIGNIFICANT CHANGE UP (ref 32–36)
MCV RBC AUTO: 87.6 FL — SIGNIFICANT CHANGE UP (ref 80–100)
MONOCYTES # BLD AUTO: 0.2 K/UL — SIGNIFICANT CHANGE UP (ref 0–0.9)
MONOCYTES NFR BLD AUTO: 1.4 % — LOW (ref 2–14)
NEUTROPHILS # BLD AUTO: 16.8 K/UL — HIGH (ref 1.8–7.4)
NEUTROPHILS NFR BLD AUTO: 94.4 % — HIGH (ref 43–77)
PLATELET # BLD AUTO: 250 K/UL — SIGNIFICANT CHANGE UP (ref 150–400)
POTASSIUM SERPL-MCNC: 3.8 MMOL/L — SIGNIFICANT CHANGE UP (ref 3.5–5.3)
POTASSIUM SERPL-SCNC: 3.8 MMOL/L — SIGNIFICANT CHANGE UP (ref 3.5–5.3)
RBC # BLD: 4.32 M/UL — SIGNIFICANT CHANGE UP (ref 3.8–5.2)
RBC # FLD: 12.3 % — SIGNIFICANT CHANGE UP (ref 10.3–14.5)
SODIUM SERPL-SCNC: 139 MMOL/L — SIGNIFICANT CHANGE UP (ref 135–145)
WBC # BLD: 17.8 K/UL — HIGH (ref 3.8–10.5)
WBC # FLD AUTO: 17.8 K/UL — HIGH (ref 3.8–10.5)

## 2017-10-18 PROCEDURE — 88307 TISSUE EXAM BY PATHOLOGIST: CPT | Mod: 26

## 2017-10-18 PROCEDURE — 88313 SPECIAL STAINS GROUP 2: CPT | Mod: 26

## 2017-10-18 PROCEDURE — 88312 SPECIAL STAINS GROUP 1: CPT | Mod: 26

## 2017-10-18 RX ORDER — HEPARIN SODIUM 5000 [USP'U]/ML
5000 INJECTION INTRAVENOUS; SUBCUTANEOUS ONCE
Qty: 0 | Refills: 0 | Status: COMPLETED | OUTPATIENT
Start: 2017-10-18 | End: 2017-10-18

## 2017-10-18 RX ORDER — SODIUM CHLORIDE 9 MG/ML
1000 INJECTION, SOLUTION INTRAVENOUS
Qty: 0 | Refills: 0 | Status: DISCONTINUED | OUTPATIENT
Start: 2017-10-18 | End: 2017-10-18

## 2017-10-18 RX ORDER — FENTANYL CITRATE 50 UG/ML
50 INJECTION INTRAVENOUS
Qty: 0 | Refills: 0 | Status: DISCONTINUED | OUTPATIENT
Start: 2017-10-18 | End: 2017-10-18

## 2017-10-18 RX ORDER — SODIUM CHLORIDE 9 MG/ML
1000 INJECTION, SOLUTION INTRAVENOUS
Qty: 0 | Refills: 0 | Status: DISCONTINUED | OUTPATIENT
Start: 2017-10-18 | End: 2017-10-20

## 2017-10-18 RX ORDER — ENOXAPARIN SODIUM 100 MG/ML
40 INJECTION SUBCUTANEOUS EVERY 12 HOURS
Qty: 0 | Refills: 0 | Status: DISCONTINUED | OUTPATIENT
Start: 2017-10-18 | End: 2017-10-20

## 2017-10-18 RX ORDER — PANTOPRAZOLE SODIUM 20 MG/1
40 TABLET, DELAYED RELEASE ORAL EVERY 24 HOURS
Qty: 0 | Refills: 0 | Status: DISCONTINUED | OUTPATIENT
Start: 2017-10-18 | End: 2017-10-20

## 2017-10-18 RX ADMIN — HEPARIN SODIUM 5000 UNIT(S): 5000 INJECTION INTRAVENOUS; SUBCUTANEOUS at 11:16

## 2017-10-18 RX ADMIN — PANTOPRAZOLE SODIUM 40 MILLIGRAM(S): 20 TABLET, DELAYED RELEASE ORAL at 14:08

## 2017-10-18 RX ADMIN — OXYCODONE HYDROCHLORIDE 10 MILLIGRAM(S): 5 TABLET ORAL at 11:16

## 2017-10-18 RX ADMIN — SODIUM CHLORIDE 150 MILLILITER(S): 9 INJECTION, SOLUTION INTRAVENOUS at 13:51

## 2017-10-18 RX ADMIN — SODIUM CHLORIDE 150 MILLILITER(S): 9 INJECTION, SOLUTION INTRAVENOUS at 18:22

## 2017-10-18 RX ADMIN — HYDROMORPHONE HYDROCHLORIDE 30 MILLILITER(S): 2 INJECTION INTRAMUSCULAR; INTRAVENOUS; SUBCUTANEOUS at 13:52

## 2017-10-18 RX ADMIN — ONDANSETRON 4 MILLIGRAM(S): 8 TABLET, FILM COATED ORAL at 13:59

## 2017-10-18 RX ADMIN — ENOXAPARIN SODIUM 40 MILLIGRAM(S): 100 INJECTION SUBCUTANEOUS at 17:30

## 2017-10-18 RX ADMIN — APREPITANT 40 MILLIGRAM(S): 80 CAPSULE ORAL at 11:16

## 2017-10-18 NOTE — BRIEF OPERATIVE NOTE - PROCEDURE
<<-----Click on this checkbox to enter Procedure Hiatal hernia repair  10/18/2017    Active  ENASTRO  Liver biopsy  10/18/2017    Active  ENASTRO  Laparoscopic sleeve gastrectomy  10/18/2017  intraoperative endoscopy  Active  ENASTRO

## 2017-10-19 DIAGNOSIS — E66.01 MORBID (SEVERE) OBESITY DUE TO EXCESS CALORIES: ICD-10-CM

## 2017-10-19 LAB
ANION GAP SERPL CALC-SCNC: 7 MMOL/L — SIGNIFICANT CHANGE UP (ref 5–17)
BASOPHILS # BLD AUTO: 0.1 K/UL — SIGNIFICANT CHANGE UP (ref 0–0.2)
BASOPHILS NFR BLD AUTO: 0.7 % — SIGNIFICANT CHANGE UP (ref 0–2)
BUN SERPL-MCNC: 9 MG/DL — SIGNIFICANT CHANGE UP (ref 7–23)
CALCIUM SERPL-MCNC: 8.9 MG/DL — SIGNIFICANT CHANGE UP (ref 8.5–10.1)
CHLORIDE SERPL-SCNC: 103 MMOL/L — SIGNIFICANT CHANGE UP (ref 96–108)
CO2 SERPL-SCNC: 26 MMOL/L — SIGNIFICANT CHANGE UP (ref 22–31)
CREAT SERPL-MCNC: 0.59 MG/DL — SIGNIFICANT CHANGE UP (ref 0.5–1.3)
EOSINOPHIL # BLD AUTO: 0 K/UL — SIGNIFICANT CHANGE UP (ref 0–0.5)
EOSINOPHIL NFR BLD AUTO: 0.1 % — SIGNIFICANT CHANGE UP (ref 0–6)
GLUCOSE SERPL-MCNC: 97 MG/DL — SIGNIFICANT CHANGE UP (ref 70–99)
HCT VFR BLD CALC: 36 % — SIGNIFICANT CHANGE UP (ref 34.5–45)
HGB BLD-MCNC: 12.1 G/DL — SIGNIFICANT CHANGE UP (ref 11.5–15.5)
LYMPHOCYTES # BLD AUTO: 1.4 K/UL — SIGNIFICANT CHANGE UP (ref 1–3.3)
LYMPHOCYTES # BLD AUTO: 10.6 % — LOW (ref 13–44)
MCHC RBC-ENTMCNC: 29 PG — SIGNIFICANT CHANGE UP (ref 27–34)
MCHC RBC-ENTMCNC: 33.6 GM/DL — SIGNIFICANT CHANGE UP (ref 32–36)
MCV RBC AUTO: 86.3 FL — SIGNIFICANT CHANGE UP (ref 80–100)
MONOCYTES # BLD AUTO: 1.3 K/UL — HIGH (ref 0–0.9)
MONOCYTES NFR BLD AUTO: 9.6 % — SIGNIFICANT CHANGE UP (ref 2–14)
NEUTROPHILS # BLD AUTO: 10.5 K/UL — HIGH (ref 1.8–7.4)
NEUTROPHILS NFR BLD AUTO: 79 % — HIGH (ref 43–77)
PLATELET # BLD AUTO: 256 K/UL — SIGNIFICANT CHANGE UP (ref 150–400)
POTASSIUM SERPL-MCNC: 4.3 MMOL/L — SIGNIFICANT CHANGE UP (ref 3.5–5.3)
POTASSIUM SERPL-SCNC: 4.3 MMOL/L — SIGNIFICANT CHANGE UP (ref 3.5–5.3)
RBC # BLD: 4.17 M/UL — SIGNIFICANT CHANGE UP (ref 3.8–5.2)
RBC # FLD: 11.6 % — SIGNIFICANT CHANGE UP (ref 10.3–14.5)
SODIUM SERPL-SCNC: 136 MMOL/L — SIGNIFICANT CHANGE UP (ref 135–145)
WBC # BLD: 13.3 K/UL — HIGH (ref 3.8–10.5)
WBC # FLD AUTO: 13.3 K/UL — HIGH (ref 3.8–10.5)

## 2017-10-19 PROCEDURE — 74241: CPT | Mod: 26

## 2017-10-19 RX ORDER — KETOROLAC TROMETHAMINE 30 MG/ML
30 SYRINGE (ML) INJECTION EVERY 6 HOURS
Qty: 0 | Refills: 0 | Status: DISCONTINUED | OUTPATIENT
Start: 2017-10-19 | End: 2017-10-20

## 2017-10-19 RX ORDER — OXYCODONE HYDROCHLORIDE 5 MG/1
10 TABLET ORAL EVERY 4 HOURS
Qty: 0 | Refills: 0 | Status: DISCONTINUED | OUTPATIENT
Start: 2017-10-19 | End: 2017-10-20

## 2017-10-19 RX ORDER — OXYCODONE HYDROCHLORIDE 5 MG/1
5 TABLET ORAL EVERY 4 HOURS
Qty: 0 | Refills: 0 | Status: DISCONTINUED | OUTPATIENT
Start: 2017-10-19 | End: 2017-10-20

## 2017-10-19 RX ADMIN — ENOXAPARIN SODIUM 40 MILLIGRAM(S): 100 INJECTION SUBCUTANEOUS at 05:12

## 2017-10-19 RX ADMIN — Medication 30 MILLIGRAM(S): at 23:35

## 2017-10-19 RX ADMIN — SODIUM CHLORIDE 150 MILLILITER(S): 9 INJECTION, SOLUTION INTRAVENOUS at 23:23

## 2017-10-19 RX ADMIN — ENOXAPARIN SODIUM 40 MILLIGRAM(S): 100 INJECTION SUBCUTANEOUS at 18:05

## 2017-10-19 RX ADMIN — Medication 30 MILLIGRAM(S): at 13:08

## 2017-10-19 RX ADMIN — Medication 30 MILLIGRAM(S): at 13:25

## 2017-10-19 RX ADMIN — Medication 30 MILLIGRAM(S): at 18:05

## 2017-10-19 RX ADMIN — PANTOPRAZOLE SODIUM 40 MILLIGRAM(S): 20 TABLET, DELAYED RELEASE ORAL at 13:14

## 2017-10-19 RX ADMIN — SODIUM CHLORIDE 150 MILLILITER(S): 9 INJECTION, SOLUTION INTRAVENOUS at 16:33

## 2017-10-19 RX ADMIN — SODIUM CHLORIDE 150 MILLILITER(S): 9 INJECTION, SOLUTION INTRAVENOUS at 01:13

## 2017-10-19 RX ADMIN — SODIUM CHLORIDE 150 MILLILITER(S): 9 INJECTION, SOLUTION INTRAVENOUS at 08:26

## 2017-10-19 NOTE — PROGRESS NOTE ADULT - SUBJECTIVE AND OBJECTIVE BOX
Post Op Day#: 1  Procedure:  Laparoscopic Sleeve Gastrectomy    The patient is doing well without complaints.    Vital Signs Last 24 Hrs  T(C): 36.9 (19 Oct 2017 08:45), Max: 37.4 (18 Oct 2017 15:00)  T(F): 98.5 (19 Oct 2017 08:45), Max: 99.3 (18 Oct 2017 15:00)  HR: 86 (19 Oct 2017 08:45) (70 - 86)  BP: 121/76 (19 Oct 2017 08:45) (104/63 - 137/79)  BP(mean): --  RR: 16 (19 Oct 2017 08:45) (13 - 16)  SpO2: 96% (19 Oct 2017 08:45) (95% - 100%)    PHYSICAL EXAM:  General: NAD.  HEENT: no JVD, no jaundice.  LUNGS: CTAB.  Heart: S1 S2 RRR  Abd: soft nt/nd   Wounds: clean dry and intact                          12.1   13.3  )-----------( 256      ( 19 Oct 2017 07:10 )             36.0       10-19    136  |  103  |  9   ----------------------------<  97  4.3   |  26  |  0.59    Ca    8.9      19 Oct 2017 07:10

## 2017-10-20 ENCOUNTER — TRANSCRIPTION ENCOUNTER (OUTPATIENT)
Age: 37
End: 2017-10-20

## 2017-10-20 VITALS
HEART RATE: 82 BPM | DIASTOLIC BLOOD PRESSURE: 76 MMHG | RESPIRATION RATE: 16 BRPM | TEMPERATURE: 99 F | OXYGEN SATURATION: 99 % | SYSTOLIC BLOOD PRESSURE: 125 MMHG

## 2017-10-20 RX ORDER — ERGOCALCIFEROL 1.25 MG/1
1 CAPSULE ORAL
Qty: 0 | Refills: 0 | COMMUNITY

## 2017-10-20 RX ADMIN — SODIUM CHLORIDE 150 MILLILITER(S): 9 INJECTION, SOLUTION INTRAVENOUS at 06:09

## 2017-10-20 RX ADMIN — Medication 30 MILLIGRAM(S): at 06:09

## 2017-10-20 RX ADMIN — ENOXAPARIN SODIUM 40 MILLIGRAM(S): 100 INJECTION SUBCUTANEOUS at 06:09

## 2017-10-20 NOTE — DISCHARGE NOTE ADULT - CARE PLAN
Principal Discharge DX:	Morbid obesity  Goal:	recover from surgery  Instructions for follow-up, activity and diet:	Follow-up in 2 weeks, follow the post op packet

## 2017-10-20 NOTE — DISCHARGE NOTE ADULT - INSTRUCTIONS
Return to ER or call MD if you experience fever greater than 101, increased abdominal pain, nausea with vomiting, opening redness or drainage from incision sites. Continue medications and diet as instructed. Follow up with doctors as instructed.

## 2017-10-20 NOTE — PROGRESS NOTE ADULT - PROBLEM SELECTOR PLAN 1
The patient is s/p lap sleeve gastrectomy and doing very well.  All discharge instructions were given to the patient, as well as potential signs of complications.  The patient will follow up in 2 weeks.  [facility]

## 2017-10-20 NOTE — DISCHARGE NOTE ADULT - MEDICATION SUMMARY - MEDICATIONS TO STOP TAKING
I will STOP taking the medications listed below when I get home from the hospital:    Vitamin D2 50,000 intl units (1.25 mg) oral capsule  -- 1 cap(s) by mouth once a week

## 2017-10-20 NOTE — PROGRESS NOTE ADULT - SUBJECTIVE AND OBJECTIVE BOX
Post Op Day#: 2  Procedure:  Laparoscopic Sleeve Gastrectomy    The patient is doing well without complaints.    Vital Signs Last 24 Hrs  T(C): 36.4 (20 Oct 2017 06:22), Max: 36.9 (19 Oct 2017 20:45)  T(F): 97.5 (20 Oct 2017 06:22), Max: 98.4 (19 Oct 2017 20:45)  HR: 62 (20 Oct 2017 06:22) (62 - 75)  BP: 105/54 (20 Oct 2017 06:22) (105/54 - 127/52)  BP(mean): --  RR: 16 (20 Oct 2017 06:22) (16 - 16)  SpO2: 100% (20 Oct 2017 06:22) (98% - 100%)    PHYSICAL EXAM:  General: NAD.  HEENT: no JVD, no jaundice.  LUNGS: CTAB.  Heart: S1 S2 RRR  Abd: soft nt/nd   Wounds: clean dry and intact                          12.1   13.3  )-----------( 256      ( 19 Oct 2017 07:10 )             36.0       10-19    136  |  103  |  9   ----------------------------<  97  4.3   |  26  |  0.59    Ca    8.9      19 Oct 2017 07:10

## 2017-10-20 NOTE — DISCHARGE NOTE ADULT - HOSPITAL COURSE
laparoscopic sleeve gastrectomy, upper G.I. series negative on post op day 1, tolerating clears, DC home

## 2017-10-20 NOTE — DISCHARGE NOTE ADULT - PATIENT PORTAL LINK FT
“You can access the [employer] Patient Portal, offered by [facility], by registering with the following website: [email or web address]”

## 2017-10-23 LAB — SURGICAL PATHOLOGY FINAL REPORT - CH: SIGNIFICANT CHANGE UP

## 2017-10-24 DIAGNOSIS — Z87.891 PERSONAL HISTORY OF NICOTINE DEPENDENCE: ICD-10-CM

## 2017-10-24 DIAGNOSIS — E55.9 VITAMIN D DEFICIENCY, UNSPECIFIED: ICD-10-CM

## 2017-10-24 DIAGNOSIS — E66.01 MORBID (SEVERE) OBESITY DUE TO EXCESS CALORIES: ICD-10-CM

## 2017-10-24 DIAGNOSIS — G47.33 OBSTRUCTIVE SLEEP APNEA (ADULT) (PEDIATRIC): ICD-10-CM

## 2017-10-24 DIAGNOSIS — K44.9 DIAPHRAGMATIC HERNIA WITHOUT OBSTRUCTION OR GANGRENE: ICD-10-CM

## 2017-10-24 DIAGNOSIS — K76.0 FATTY (CHANGE OF) LIVER, NOT ELSEWHERE CLASSIFIED: ICD-10-CM

## 2017-11-26 ENCOUNTER — INPATIENT (INPATIENT)
Facility: HOSPITAL | Age: 37
LOS: 4 days | Discharge: HOME | End: 2017-12-01
Attending: FAMILY MEDICINE | Admitting: FAMILY MEDICINE
Payer: COMMERCIAL

## 2017-11-26 VITALS
DIASTOLIC BLOOD PRESSURE: 68 MMHG | WEIGHT: 199.96 LBS | TEMPERATURE: 99 F | HEART RATE: 105 BPM | HEIGHT: 64 IN | SYSTOLIC BLOOD PRESSURE: 116 MMHG | OXYGEN SATURATION: 98 %

## 2017-11-26 DIAGNOSIS — Z98.890 OTHER SPECIFIED POSTPROCEDURAL STATES: Chronic | ICD-10-CM

## 2017-11-26 LAB
ALBUMIN SERPL ELPH-MCNC: 3.8 G/DL — SIGNIFICANT CHANGE UP (ref 3.3–5)
ALP SERPL-CCNC: 68 U/L — SIGNIFICANT CHANGE UP (ref 40–120)
ALT FLD-CCNC: 47 U/L — SIGNIFICANT CHANGE UP (ref 12–78)
ANION GAP SERPL CALC-SCNC: 10 MMOL/L — SIGNIFICANT CHANGE UP (ref 5–17)
APPEARANCE UR: (no result)
AST SERPL-CCNC: 27 U/L — SIGNIFICANT CHANGE UP (ref 15–37)
BACTERIA # UR AUTO: (no result)
BASOPHILS # BLD AUTO: 0.1 K/UL — SIGNIFICANT CHANGE UP (ref 0–0.2)
BASOPHILS NFR BLD AUTO: 0.5 % — SIGNIFICANT CHANGE UP (ref 0–2)
BILIRUB SERPL-MCNC: 1.3 MG/DL — HIGH (ref 0.2–1.2)
BILIRUB UR-MCNC: (no result)
BUN SERPL-MCNC: 10 MG/DL — SIGNIFICANT CHANGE UP (ref 7–23)
CALCIUM SERPL-MCNC: 9.2 MG/DL — SIGNIFICANT CHANGE UP (ref 8.5–10.1)
CHLORIDE SERPL-SCNC: 105 MMOL/L — SIGNIFICANT CHANGE UP (ref 96–108)
CO2 SERPL-SCNC: 26 MMOL/L — SIGNIFICANT CHANGE UP (ref 22–31)
COLOR SPEC: YELLOW — SIGNIFICANT CHANGE UP
COMMENT - URINE: SIGNIFICANT CHANGE UP
CREAT SERPL-MCNC: 0.76 MG/DL — SIGNIFICANT CHANGE UP (ref 0.5–1.3)
DIFF PNL FLD: (no result)
EOSINOPHIL # BLD AUTO: 0 K/UL — SIGNIFICANT CHANGE UP (ref 0–0.5)
EOSINOPHIL NFR BLD AUTO: 0.1 % — SIGNIFICANT CHANGE UP (ref 0–6)
EPI CELLS # UR: SIGNIFICANT CHANGE UP
GLUCOSE SERPL-MCNC: 108 MG/DL — HIGH (ref 70–99)
GLUCOSE UR QL: NEGATIVE MG/DL — SIGNIFICANT CHANGE UP
HCT VFR BLD CALC: 42 % — SIGNIFICANT CHANGE UP (ref 34.5–45)
HGB BLD-MCNC: 13.5 G/DL — SIGNIFICANT CHANGE UP (ref 11.5–15.5)
INR BLD: 1.19 RATIO — HIGH (ref 0.88–1.16)
KETONES UR-MCNC: (no result)
LEUKOCYTE ESTERASE UR-ACNC: (no result)
LIDOCAIN IGE QN: 77 U/L — SIGNIFICANT CHANGE UP (ref 73–393)
LYMPHOCYTES # BLD AUTO: 0.5 K/UL — LOW (ref 1–3.3)
LYMPHOCYTES # BLD AUTO: 3.6 % — LOW (ref 13–44)
MANUAL DIF COMMENT BLD-IMP: SIGNIFICANT CHANGE UP
MCHC RBC-ENTMCNC: 28.3 PG — SIGNIFICANT CHANGE UP (ref 27–34)
MCHC RBC-ENTMCNC: 32.2 GM/DL — SIGNIFICANT CHANGE UP (ref 32–36)
MCV RBC AUTO: 87.9 FL — SIGNIFICANT CHANGE UP (ref 80–100)
MONOCYTES # BLD AUTO: 1.3 K/UL — HIGH (ref 0–0.9)
MONOCYTES NFR BLD AUTO: 9.4 % — SIGNIFICANT CHANGE UP (ref 2–14)
NEUTROPHILS # BLD AUTO: 11.8 K/UL — HIGH (ref 1.8–7.4)
NEUTROPHILS NFR BLD AUTO: 86.3 % — HIGH (ref 43–77)
NITRITE UR-MCNC: NEGATIVE — SIGNIFICANT CHANGE UP
PH UR: 6.5 — SIGNIFICANT CHANGE UP (ref 5–8)
PLAT MORPH BLD: NORMAL — SIGNIFICANT CHANGE UP
PLATELET # BLD AUTO: 191 K/UL — SIGNIFICANT CHANGE UP (ref 150–400)
POTASSIUM SERPL-MCNC: 3.5 MMOL/L — SIGNIFICANT CHANGE UP (ref 3.5–5.3)
POTASSIUM SERPL-SCNC: 3.5 MMOL/L — SIGNIFICANT CHANGE UP (ref 3.5–5.3)
PROT SERPL-MCNC: 7.4 GM/DL — SIGNIFICANT CHANGE UP (ref 6–8.3)
PROT UR-MCNC: 100 MG/DL
PROTHROM AB SERPL-ACNC: 12.9 SEC — HIGH (ref 9.8–12.7)
RBC # BLD: 4.77 M/UL — SIGNIFICANT CHANGE UP (ref 3.8–5.2)
RBC # FLD: 13.6 % — SIGNIFICANT CHANGE UP (ref 10.3–14.5)
RBC BLD AUTO: NORMAL — SIGNIFICANT CHANGE UP
RBC CASTS # UR COMP ASSIST: (no result) /HPF (ref 0–4)
SODIUM SERPL-SCNC: 141 MMOL/L — SIGNIFICANT CHANGE UP (ref 135–145)
SP GR SPEC: 1.01 — SIGNIFICANT CHANGE UP (ref 1.01–1.02)
UROBILINOGEN FLD QL: 8 MG/DL
WBC # BLD: 13.6 K/UL — HIGH (ref 3.8–10.5)
WBC # FLD AUTO: 13.6 K/UL — HIGH (ref 3.8–10.5)
WBC UR QL: >50

## 2017-11-26 PROCEDURE — 99285 EMERGENCY DEPT VISIT HI MDM: CPT

## 2017-11-26 PROCEDURE — 74177 CT ABD & PELVIS W/CONTRAST: CPT | Mod: 26

## 2017-11-26 PROCEDURE — 71020: CPT | Mod: 26

## 2017-11-26 RX ORDER — CEFTRIAXONE 500 MG/1
1000 INJECTION, POWDER, FOR SOLUTION INTRAMUSCULAR; INTRAVENOUS ONCE
Qty: 0 | Refills: 0 | Status: COMPLETED | OUTPATIENT
Start: 2017-11-26 | End: 2017-11-26

## 2017-11-26 RX ORDER — SODIUM CHLORIDE 9 MG/ML
1000 INJECTION, SOLUTION INTRAVENOUS
Qty: 0 | Refills: 0 | Status: DISCONTINUED | OUTPATIENT
Start: 2017-11-26 | End: 2017-11-30

## 2017-11-26 RX ORDER — SODIUM CHLORIDE 9 MG/ML
1000 INJECTION INTRAMUSCULAR; INTRAVENOUS; SUBCUTANEOUS ONCE
Qty: 0 | Refills: 0 | Status: COMPLETED | OUTPATIENT
Start: 2017-11-26 | End: 2017-11-26

## 2017-11-26 RX ORDER — SODIUM CHLORIDE 9 MG/ML
1000 INJECTION, SOLUTION INTRAVENOUS
Qty: 0 | Refills: 0 | Status: COMPLETED | OUTPATIENT
Start: 2017-11-26 | End: 2017-11-26

## 2017-11-26 RX ORDER — CEFTRIAXONE 500 MG/1
1 INJECTION, POWDER, FOR SOLUTION INTRAMUSCULAR; INTRAVENOUS ONCE
Qty: 0 | Refills: 0 | Status: DISCONTINUED | OUTPATIENT
Start: 2017-11-26 | End: 2017-11-26

## 2017-11-26 RX ORDER — KETOROLAC TROMETHAMINE 30 MG/ML
30 SYRINGE (ML) INJECTION ONCE
Qty: 0 | Refills: 0 | Status: DISCONTINUED | OUTPATIENT
Start: 2017-11-26 | End: 2017-11-26

## 2017-11-26 RX ORDER — ONDANSETRON 8 MG/1
4 TABLET, FILM COATED ORAL ONCE
Qty: 0 | Refills: 0 | Status: COMPLETED | OUTPATIENT
Start: 2017-11-26 | End: 2017-11-26

## 2017-11-26 RX ORDER — MORPHINE SULFATE 50 MG/1
4 CAPSULE, EXTENDED RELEASE ORAL ONCE
Qty: 0 | Refills: 0 | Status: DISCONTINUED | OUTPATIENT
Start: 2017-11-26 | End: 2017-11-26

## 2017-11-26 RX ADMIN — SODIUM CHLORIDE 300 MILLILITER(S): 9 INJECTION, SOLUTION INTRAVENOUS at 20:08

## 2017-11-26 RX ADMIN — CEFTRIAXONE 1000 MILLIGRAM(S): 500 INJECTION, POWDER, FOR SOLUTION INTRAMUSCULAR; INTRAVENOUS at 21:48

## 2017-11-26 RX ADMIN — MORPHINE SULFATE 4 MILLIGRAM(S): 50 CAPSULE, EXTENDED RELEASE ORAL at 18:03

## 2017-11-26 RX ADMIN — Medication 30 MILLIGRAM(S): at 20:45

## 2017-11-26 RX ADMIN — Medication 30 MILLIGRAM(S): at 21:15

## 2017-11-26 RX ADMIN — ONDANSETRON 4 MILLIGRAM(S): 8 TABLET, FILM COATED ORAL at 17:44

## 2017-11-26 RX ADMIN — SODIUM CHLORIDE 1000 MILLILITER(S): 9 INJECTION INTRAMUSCULAR; INTRAVENOUS; SUBCUTANEOUS at 17:44

## 2017-11-26 NOTE — ED STATDOCS - OBJECTIVE STATEMENT
37 y/o F PMHx Gastric sleeve, presents to the ED c/o right sided abd pain. The pt provides that she has increased nausea and vomiting since about every hour since yesterday, and decreased appetite. The pt adds that she has dark colored urine and increased frequency. PMD Dr. Winkler, no h/o headache, fever, chills, dizziness, diarrhea, constipation, cp, cough, sob, rash or urinary incontinence.

## 2017-11-26 NOTE — ED ADULT NURSE REASSESSMENT NOTE - COMFORT CARE
wait time explained/ambulated to bathroom/po fluids offered/repositioned
wait time explained/warm blanket provided/plan of care explained
plan of care explained/side rails up/warm blanket provided
plan of care explained/wait time explained/ice chips provided

## 2017-11-26 NOTE — ED STATDOCS - PROGRESS NOTE DETAILS
signed Ernestina Collins PA-C Pt seen initially in intake by Dr Fatima.   Spoke to Dr Melchor, recommends CT scan with bariatric sx protocol for PO contrast and call when results are back. signed Ernestina Collins PA-C   Pt with +UA and pyelonephritis on CT scan. Spoke to Dr Melchor, pt to be admitted to medicine for IV abx, unable to tolerate PO meds for discharge. Admit for IV abx. Pt agrees with admission and plan of care.

## 2017-11-26 NOTE — ED STATDOCS - MEDICAL DECISION MAKING DETAILS
37 y/o F c/o abd pain, nv, frequency, to obtain labs, meds, iv fluids, reassess 35 y/o F c/o abd pain, nv, frequency, to obtain labs, meds, iv fluids, reassess    Pt with pyelonephritis.  IV antibiotics, IVF, antiemetics.  Admit to medicine.

## 2017-11-26 NOTE — ED ADULT NURSE REASSESSMENT NOTE - NS ED NURSE REASSESS COMMENT FT1
Patient received from YAZ Whitley. Patient resting comfortably in bed. Safety and comfort maintained. Will continue to montior. Patient received from YAZ Whitley on 11/27/17 at 00:10. Patient resting comfortably in bed. Safety and comfort maintained. Will continue to montior.

## 2017-11-26 NOTE — ED ADULT NURSE NOTE - OBJECTIVE STATEMENT
Patient presents complaining of abdominal pain with nausea and vomiting and decreased appetite. also reporting dark colored urine and increased urge to go.

## 2017-11-27 ENCOUNTER — TRANSCRIPTION ENCOUNTER (OUTPATIENT)
Age: 37
End: 2017-11-27

## 2017-11-27 DIAGNOSIS — Z87.19 PERSONAL HISTORY OF OTHER DISEASES OF THE DIGESTIVE SYSTEM: Chronic | ICD-10-CM

## 2017-11-27 DIAGNOSIS — N10 ACUTE PYELONEPHRITIS: ICD-10-CM

## 2017-11-27 DIAGNOSIS — K21.9 GASTRO-ESOPHAGEAL REFLUX DISEASE WITHOUT ESOPHAGITIS: ICD-10-CM

## 2017-11-27 DIAGNOSIS — E55.9 VITAMIN D DEFICIENCY, UNSPECIFIED: ICD-10-CM

## 2017-11-27 DIAGNOSIS — E66.01 MORBID (SEVERE) OBESITY DUE TO EXCESS CALORIES: ICD-10-CM

## 2017-11-27 DIAGNOSIS — Z29.9 ENCOUNTER FOR PROPHYLACTIC MEASURES, UNSPECIFIED: ICD-10-CM

## 2017-11-27 DIAGNOSIS — Z98.84 BARIATRIC SURGERY STATUS: Chronic | ICD-10-CM

## 2017-11-27 DIAGNOSIS — N12 TUBULO-INTERSTITIAL NEPHRITIS, NOT SPECIFIED AS ACUTE OR CHRONIC: ICD-10-CM

## 2017-11-27 DIAGNOSIS — R13.10 DYSPHAGIA, UNSPECIFIED: ICD-10-CM

## 2017-11-27 LAB
24R-OH-CALCIDIOL SERPL-MCNC: 15 NG/ML — LOW (ref 30–80)
ANION GAP SERPL CALC-SCNC: 8 MMOL/L — SIGNIFICANT CHANGE UP (ref 5–17)
BASOPHILS # BLD AUTO: 0.1 K/UL — SIGNIFICANT CHANGE UP (ref 0–0.2)
BUN SERPL-MCNC: 10 MG/DL — SIGNIFICANT CHANGE UP (ref 7–23)
BURR CELLS BLD QL SMEAR: PRESENT — SIGNIFICANT CHANGE UP
CALCIUM SERPL-MCNC: 8 MG/DL — LOW (ref 8.5–10.1)
CHLORIDE SERPL-SCNC: 110 MMOL/L — HIGH (ref 96–108)
CO2 SERPL-SCNC: 22 MMOL/L — SIGNIFICANT CHANGE UP (ref 22–31)
CREAT SERPL-MCNC: 0.57 MG/DL — SIGNIFICANT CHANGE UP (ref 0.5–1.3)
EOSINOPHIL # BLD AUTO: 0 K/UL — SIGNIFICANT CHANGE UP (ref 0–0.5)
GLUCOSE SERPL-MCNC: 94 MG/DL — SIGNIFICANT CHANGE UP (ref 70–99)
HCT VFR BLD CALC: 34.5 % — SIGNIFICANT CHANGE UP (ref 34.5–45)
HGB BLD-MCNC: 11.3 G/DL — LOW (ref 11.5–15.5)
LG PLATELETS BLD QL AUTO: SLIGHT — SIGNIFICANT CHANGE UP
LYMPHOCYTES # BLD AUTO: 1.3 K/UL — SIGNIFICANT CHANGE UP (ref 1–3.3)
LYMPHOCYTES # BLD AUTO: 10 % — LOW (ref 13–44)
MANUAL DIF COMMENT BLD-IMP: SIGNIFICANT CHANGE UP
MCHC RBC-ENTMCNC: 28.8 PG — SIGNIFICANT CHANGE UP (ref 27–34)
MCHC RBC-ENTMCNC: 32.7 GM/DL — SIGNIFICANT CHANGE UP (ref 32–36)
MCV RBC AUTO: 88.1 FL — SIGNIFICANT CHANGE UP (ref 80–100)
MONOCYTES # BLD AUTO: 1.6 K/UL — HIGH (ref 0–0.9)
MONOCYTES NFR BLD AUTO: 5 % — SIGNIFICANT CHANGE UP (ref 2–14)
NEUTROPHILS # BLD AUTO: 14.7 K/UL — HIGH (ref 1.8–7.4)
NEUTROPHILS NFR BLD AUTO: 83 % — HIGH (ref 43–77)
NEUTS BAND # BLD: 2 % — SIGNIFICANT CHANGE UP (ref 0–8)
OVALOCYTES BLD QL SMEAR: SLIGHT — SIGNIFICANT CHANGE UP
PLAT MORPH BLD: NORMAL — SIGNIFICANT CHANGE UP
PLATELET # BLD AUTO: 136 K/UL — LOW (ref 150–400)
POIKILOCYTOSIS BLD QL AUTO: SLIGHT — SIGNIFICANT CHANGE UP
POTASSIUM SERPL-MCNC: 3.7 MMOL/L — SIGNIFICANT CHANGE UP (ref 3.5–5.3)
POTASSIUM SERPL-SCNC: 3.7 MMOL/L — SIGNIFICANT CHANGE UP (ref 3.5–5.3)
RBC # BLD: 3.92 M/UL — SIGNIFICANT CHANGE UP (ref 3.8–5.2)
RBC # FLD: 14 % — SIGNIFICANT CHANGE UP (ref 10.3–14.5)
RBC BLD AUTO: (no result)
SODIUM SERPL-SCNC: 140 MMOL/L — SIGNIFICANT CHANGE UP (ref 135–145)
WBC # BLD: 17.6 K/UL — HIGH (ref 3.8–10.5)
WBC # FLD AUTO: 17.6 K/UL — HIGH (ref 3.8–10.5)

## 2017-11-27 PROCEDURE — 93010 ELECTROCARDIOGRAM REPORT: CPT

## 2017-11-27 RX ORDER — KETOROLAC TROMETHAMINE 30 MG/ML
10 SYRINGE (ML) INJECTION ONCE
Qty: 0 | Refills: 0 | Status: DISCONTINUED | OUTPATIENT
Start: 2017-11-27 | End: 2017-11-27

## 2017-11-27 RX ORDER — OXYMETAZOLINE HYDROCHLORIDE 0.5 MG/ML
1 SPRAY NASAL DAILY
Qty: 0 | Refills: 0 | Status: DISCONTINUED | OUTPATIENT
Start: 2017-11-27 | End: 2017-12-01

## 2017-11-27 RX ORDER — CEFTRIAXONE 500 MG/1
1 INJECTION, POWDER, FOR SOLUTION INTRAMUSCULAR; INTRAVENOUS EVERY 24 HOURS
Qty: 0 | Refills: 0 | Status: DISCONTINUED | OUTPATIENT
Start: 2017-11-27 | End: 2017-11-27

## 2017-11-27 RX ORDER — CEFTRIAXONE 500 MG/1
1000 INJECTION, POWDER, FOR SOLUTION INTRAMUSCULAR; INTRAVENOUS EVERY 24 HOURS
Qty: 0 | Refills: 0 | Status: DISCONTINUED | OUTPATIENT
Start: 2017-11-27 | End: 2017-11-30

## 2017-11-27 RX ORDER — ALBUTEROL 90 UG/1
2 AEROSOL, METERED ORAL
Qty: 0 | Refills: 0 | COMMUNITY

## 2017-11-27 RX ORDER — CHOLECALCIFEROL (VITAMIN D3) 125 MCG
400 CAPSULE ORAL DAILY
Qty: 0 | Refills: 0 | Status: DISCONTINUED | OUTPATIENT
Start: 2017-11-27 | End: 2017-12-01

## 2017-11-27 RX ORDER — CHOLECALCIFEROL (VITAMIN D3) 125 MCG
1 CAPSULE ORAL
Qty: 0 | Refills: 0 | COMMUNITY

## 2017-11-27 RX ORDER — URSODIOL 250 MG/1
1 TABLET, FILM COATED ORAL
Qty: 0 | Refills: 0 | COMMUNITY

## 2017-11-27 RX ORDER — ONDANSETRON 8 MG/1
2 TABLET, FILM COATED ORAL EVERY 6 HOURS
Qty: 0 | Refills: 0 | Status: DISCONTINUED | OUTPATIENT
Start: 2017-11-27 | End: 2017-12-01

## 2017-11-27 RX ORDER — ONDANSETRON 8 MG/1
4 TABLET, FILM COATED ORAL ONCE
Qty: 0 | Refills: 0 | Status: COMPLETED | OUTPATIENT
Start: 2017-11-27 | End: 2017-11-27

## 2017-11-27 RX ORDER — SODIUM CHLORIDE 9 MG/ML
500 INJECTION INTRAMUSCULAR; INTRAVENOUS; SUBCUTANEOUS ONCE
Qty: 0 | Refills: 0 | Status: COMPLETED | OUTPATIENT
Start: 2017-11-27 | End: 2017-11-27

## 2017-11-27 RX ORDER — SODIUM CHLORIDE 9 MG/ML
1000 INJECTION INTRAMUSCULAR; INTRAVENOUS; SUBCUTANEOUS
Qty: 0 | Refills: 0 | Status: DISCONTINUED | OUTPATIENT
Start: 2017-11-27 | End: 2017-11-28

## 2017-11-27 RX ORDER — HEPARIN SODIUM 5000 [USP'U]/ML
5000 INJECTION INTRAVENOUS; SUBCUTANEOUS EVERY 12 HOURS
Qty: 0 | Refills: 0 | Status: DISCONTINUED | OUTPATIENT
Start: 2017-11-27 | End: 2017-12-01

## 2017-11-27 RX ORDER — MORPHINE SULFATE 50 MG/1
2 CAPSULE, EXTENDED RELEASE ORAL EVERY 4 HOURS
Qty: 0 | Refills: 0 | Status: DISCONTINUED | OUTPATIENT
Start: 2017-11-27 | End: 2017-12-01

## 2017-11-27 RX ORDER — PANTOPRAZOLE SODIUM 20 MG/1
40 TABLET, DELAYED RELEASE ORAL
Qty: 0 | Refills: 0 | Status: DISCONTINUED | OUTPATIENT
Start: 2017-11-27 | End: 2017-12-01

## 2017-11-27 RX ORDER — CHOLECALCIFEROL (VITAMIN D3) 125 MCG
400 CAPSULE ORAL
Qty: 0 | Refills: 0 | COMMUNITY
Start: 2017-11-27

## 2017-11-27 RX ORDER — INFLUENZA VIRUS VACCINE 15; 15; 15; 15 UG/.5ML; UG/.5ML; UG/.5ML; UG/.5ML
0.5 SUSPENSION INTRAMUSCULAR ONCE
Qty: 0 | Refills: 0 | Status: COMPLETED | OUTPATIENT
Start: 2017-11-27 | End: 2017-11-27

## 2017-11-27 RX ADMIN — Medication 10 MILLIGRAM(S): at 12:29

## 2017-11-27 RX ADMIN — HEPARIN SODIUM 5000 UNIT(S): 5000 INJECTION INTRAVENOUS; SUBCUTANEOUS at 17:55

## 2017-11-27 RX ADMIN — Medication 10 MILLIGRAM(S): at 19:55

## 2017-11-27 RX ADMIN — ONDANSETRON 4 MILLIGRAM(S): 8 TABLET, FILM COATED ORAL at 04:41

## 2017-11-27 RX ADMIN — MORPHINE SULFATE 2 MILLIGRAM(S): 50 CAPSULE, EXTENDED RELEASE ORAL at 02:36

## 2017-11-27 RX ADMIN — MORPHINE SULFATE 2 MILLIGRAM(S): 50 CAPSULE, EXTENDED RELEASE ORAL at 07:51

## 2017-11-27 RX ADMIN — OXYMETAZOLINE HYDROCHLORIDE 1 SPRAY(S): 0.5 SPRAY NASAL at 17:56

## 2017-11-27 RX ADMIN — SODIUM CHLORIDE 500 MILLILITER(S): 9 INJECTION INTRAMUSCULAR; INTRAVENOUS; SUBCUTANEOUS at 02:19

## 2017-11-27 RX ADMIN — PANTOPRAZOLE SODIUM 40 MILLIGRAM(S): 20 TABLET, DELAYED RELEASE ORAL at 07:53

## 2017-11-27 RX ADMIN — CEFTRIAXONE 1000 MILLIGRAM(S): 500 INJECTION, POWDER, FOR SOLUTION INTRAMUSCULAR; INTRAVENOUS at 16:09

## 2017-11-27 RX ADMIN — Medication 10 MILLIGRAM(S): at 12:45

## 2017-11-27 RX ADMIN — HEPARIN SODIUM 5000 UNIT(S): 5000 INJECTION INTRAVENOUS; SUBCUTANEOUS at 07:53

## 2017-11-27 NOTE — H&P ADULT - PSH
H/O bariatric surgery  Gastric sleeve  H/O hiatal hernia    History of esophagogastroduodenoscopy (EGD)  2017

## 2017-11-27 NOTE — DISCHARGE NOTE ADULT - PATIENT PORTAL LINK FT
“You can access the Follow[employer] Patient Portal, offered by [facility], by registering with the following website: [email or web address]”

## 2017-11-27 NOTE — H&P ADULT - ASSESSMENT
35yo F w/hx of morbid obesity s/p gastric sleeve (10/18/17), hiatal hernia s/p repair presents with complaints of increased urinary urgency and frequency x1wk.

## 2017-11-27 NOTE — H&P ADULT - NSHPPHYSICALEXAM_GEN_ALL_CORE
Vital Signs   T(C): 37.1 (27 Nov 2017 00:42), Max: 37.3 (26 Nov 2017 15:28)  T(F): 98.8 (27 Nov 2017 00:42), Max: 99.2 (26 Nov 2017 15:28)  HR: 74 (27 Nov 2017 00:42) (74 - 105)  BP: 98/54 (27 Nov 2017 00:42) (98/54 - 116/68)  RR: 16 (27 Nov 2017 00:42) (16 - 16)  SpO2: 100% (27 Nov 2017 00:42) (98% - 100%)    PHYSICAL EXAM:  Constitutional: NAD, awake and alert, well-developed  HEENT: EOMI, Normal Hearing, MMM  Neck: Soft and supple, No LAD, No JVD  Respiratory: Breath sounds are clear bilaterally, No wheezing, rales or rhonchi  Cardiovascular: S1 and S2, regular rate and rhythm, no Murmurs, gallops or rubs  Gastrointestinal: Bowel Sounds present, soft, nontender, nondistended, no guarding, no rebound  Extremities: No peripheral edema  Vascular: 2+ peripheral pulses  Neurological: A/O x 3, no focal deficits  Musculoskeletal: 5/5 strength b/l upper and lower extremities  Skin: No rashes Vital Signs   T(C): 37.1 (27 Nov 2017 00:42), Max: 37.3 (26 Nov 2017 15:28)  T(F): 98.8 (27 Nov 2017 00:42), Max: 99.2 (26 Nov 2017 15:28)  HR: 74 (27 Nov 2017 00:42) (74 - 105)  BP: 98/54 (27 Nov 2017 00:42) (98/54 - 116/68)  RR: 16 (27 Nov 2017 00:42) (16 - 16)  SpO2: 100% (27 Nov 2017 00:42) (98% - 100%)    PHYSICAL EXAM:  Constitutional: NAD, awake and alert, well-developed  HEENT: EOMI, Normal Hearing, MMM  Neck: Soft and supple, No LAD, No JVD  Respiratory: Breath sounds are clear bilaterally, No wheezing, rales or rhonchi  Cardiovascular: S1 and S2, regular rate and rhythm, no Murmurs, gallops or rubs  Gastrointestinal: Bowel Sounds present, soft, nontender, nondistended, no guarding, no rebound  Extremities: No peripheral edema  Vascular: 2+ peripheral pulses  Neurological: A/O x 3, no focal deficits  Musculoskeletal: 5/5 strength b/l upper and lower extremities  Skin: No rashes; tattoos on R part of her neck, Right arm Vital Signs   T(C): 37.1 (27 Nov 2017 00:42), Max: 37.3 (26 Nov 2017 15:28)  T(F): 98.8 (27 Nov 2017 00:42), Max: 99.2 (26 Nov 2017 15:28)  HR: 74 (27 Nov 2017 00:42) (74 - 105)  BP: 98/54 (27 Nov 2017 00:42) (98/54 - 116/68)  RR: 16 (27 Nov 2017 00:42) (16 - 16)  SpO2: 100% (27 Nov 2017 00:42) (98% - 100%)    PHYSICAL EXAM:  Constitutional: NAD, awake and alert, well-developed  HEENT: EOMI, Normal Hearing, MMM  Neck: Soft and supple, No LAD, No JVD  Respiratory: Breath sounds are clear bilaterally, No wheezing, rales or rhonchi  Cardiovascular: S1 and S2, regular rate and rhythm, no Murmurs, gallops or rubs  Gastrointestinal: Bowel Sounds present, soft, nontender, nondistended, no guarding, no rebound  Extremities: No peripheral edema  Vascular: 2+ peripheral pulses  Neurological: A/O x 3, no focal deficits  Musculoskeletal: 5/5 strength b/l upper and lower extremities  Extremities: no edema  Skin: No rashes; tattoos on R part of her neck, Right arm

## 2017-11-27 NOTE — DISCHARGE NOTE ADULT - NS AS ACTIVITY OBS
Walking-Outdoors allowed/Stairs allowed/Return to Work/School allowed/Walking-Indoors allowed/Sex allowed/Driving allowed/Bathing allowed/Showering allowed

## 2017-11-27 NOTE — H&P ADULT - NSHPLABSRESULTS_GEN_ALL_CORE
13.5   13.6  )-----------( 191      ( 2017 17:05 )             42.0     2017 17:05    141    |  105    |  10     ----------------------------<  108    3.5     |  26     |  0.76     Ca    9.2        2017 17:05    TPro  7.4    /  Alb  3.8    /  TBili  1.3    /  DBili  x      /  AST  27     /  ALT  47     /  AlkPhos  68     2017 17:05    LIVER FUNCTIONS - ( 2017 17:05 )  Alb: 3.8 g/dL / Pro: 7.4 gm/dL / ALK PHOS: 68 U/L / ALT: 47 U/L / AST: 27 U/L / GGT: x           PT/INR - ( 2017 17:05 )   PT: 12.9 sec;   INR: 1.19 ratio      Urinalysis Basic - ( 2017 17:42 )    Color: Yellow / Appearance: very cloudy / S.010 / pH: x  Gluc: x / Ketone: Moderate  / Bili: Small / Urobili: 8 mg/dL   Blood: x / Protein: 100 mg/dL / Nitrite: Negative   Leuk Esterase: Moderate / RBC: 11-25 /HPF / WBC >50   Sq Epi: x / Non Sq Epi: Few / Bacteria: Moderate

## 2017-11-27 NOTE — H&P ADULT - PROBLEM SELECTOR PLAN 2
- s/p gastric sleeve  - Continue PO intake per post gastric sleeve protocol  - Daily multivitamin  - Diet education from RN

## 2017-11-27 NOTE — DISCHARGE NOTE ADULT - MEDICATION SUMMARY - MEDICATIONS TO TAKE
I will START or STAY ON the medications listed below when I get home from the hospital:    Proventil HFA 90 mcg/inh inhalation aerosol  -- 2 puff(s) inhaled 4 times a day, As Needed - for shortness of breath and/or wheezing  -- Indication: For Shortness of breath    cefdinir 250 mg/5 mL oral liquid  -- 6 milliliter(s) by mouth 2 times a day  -- Indication: For Pyelonephritis    Actigall 300 mg oral capsule  -- 1 cap(s) by mouth every 8 hours  -- Indication: For Post-bariatric surgery    ursodiol 300 mg oral capsule  -- 1 cap(s) by mouth 2 times a day  -- Indication: For Post-bariatric surgery    Protonix 40 mg oral delayed release tablet  -- 1 tab(s) by mouth once a day (before a meal)  -- Indication: For GERD (gastroesophageal reflux disease)    Protonix 40 mg oral delayed release tablet  -- 1 tab(s) by mouth once a day  -- Indication: For GERD (gastroesophageal reflux disease)    Vitamin D3 5000 intl units oral tablet  -- 1 tab(s) by mouth once a day  -- Indication: For Vitamin D deficiency    Thera-D Rapid Repletion oral tablet  -- 400 unit(s) by mouth once a day  -- Indication: For Vitamin D deficiency

## 2017-11-27 NOTE — DISCHARGE NOTE ADULT - PLAN OF CARE
Resolution of pain Continue antibiotics Follow up with your bariatric surgeon within a week of discharge Continue home meds - pantoprazole Continue home meds - vitamin D supplements Now improved after IV decadron.  Follow up with your bariatric surgeon within a week of discharge Continue oral antibiotics for another 8 days - cefdinir 300 mg twice a day. Improved after IV decadron.  Follow up with your bariatric surgeon within a week of discharge

## 2017-11-27 NOTE — H&P ADULT - NSHPREVIEWOFSYSTEMS_GEN_ALL_CORE
CONSTITUTIONAL: No weakness or fevers; +chills  EYES/ENT: No visual changes;  No vertigo or throat pain   NECK: No pain or stiffness  RESPIRATORY: No cough, wheezing, hemoptysis; No shortness of breath  CARDIOVASCULAR: No chest pain or palpitations  GASTROINTESTINAL: No abdominal or epigastric pain. +nausea and vomiting, no hematemesis; No diarrhea or constipation. No melena or hematochezia.  GENITOURINARY: No dysuria or hematuria; +urinary frequency and urgency  NEUROLOGICAL: No numbness or weakness  SKIN: No itching, burning, rashes, or lesions   All other review of systems is negative unless indicated above CONSTITUTIONAL: No weakness or fevers; +chills  EYES/ENT: No visual changes;  No vertigo or throat pain   NECK: No pain or stiffness  RESPIRATORY: No cough, wheezing, hemoptysis; No shortness of breath  CARDIOVASCULAR: No chest pain or palpitations  GASTROINTESTINAL: No epigastric pain. +nausea and vomiting, RLQ and RUQ abdominal pain. No hematemesis; No diarrhea or constipation. No melena or hematochezia.  GENITOURINARY: No dysuria or hematuria; +urinary frequency and urgency  NEUROLOGICAL: No numbness or weakness  SKIN: No itching, burning, rashes, or lesions   All other review of systems is negative unless indicated above

## 2017-11-27 NOTE — PROGRESS NOTE ADULT - SUBJECTIVE AND OBJECTIVE BOX
36F w/ PMH of morbid obesity s/p sleeve gastrectomy and hiatal hernia repair w/ Dr. Kolb 10/18/17 presents with complaints of increased urinary urgency and frequency x 1wk. Reports that she's been feeling the urge to urinate more often than normal however, there is a marked decrease in urinary output; she would put out drops of urine per trip to the bathroom. Also reports darkening of urine. Decreased PO hydration overall.     11/27: Pt seen and examined bedside. She is not tolerating her diet, spitting up everything she swallows. Denies dysuria/hematuria. She is also reporting some ear pain associated w/ congestion and coughing.    REVIEW OF SYSTEMS:    CONSTITUTIONAL: No weakness, fevers or chills  EYES/ENT: No visual changes;  No vertigo. + throat pain with swallowing.  NECK: No pain or stiffness  RESPIRATORY: No cough, wheezing, hemoptysis; No shortness of breath  CARDIOVASCULAR: No chest pain or palpitations  GASTROINTESTINAL: No abdominal or epigastric pain. No nausea, vomiting, or hematemesis; No diarrhea or constipation. No melena or hematochezia.  GENITOURINARY: No dysuria, frequency or hematuria  NEUROLOGICAL: No numbness or weakness  SKIN: No itching, burning, rashes, or lesions   All other review of systems is negative unless indicated above.    Vital Signs Last 24 Hrs  T(C): 37.4 (11-27-17 @ 12:30)  T(F): 99.3 (11-27-17 @ 12:30), Max: 99.3 (11-27-17 @ 12:30)  HR: 81 (11-27-17 @ 12:30) (74 - 105)  BP: 100/56 (11-27-17 @ 12:30)  BP(mean): --  RR: 16 (11-27-17 @ 12:30) (16 - 17)  SpO2: 98% (11-27-17 @ 12:30) (98% - 100%)  Wt(kg): --    PHYSICAL EXAM:    GENERAL: AOx3, NAD, well-groomed, well-developed  HEAD:  NC/AT  EYES: EOMI, PERRLA, no scleral icterus  HEENT: Moist mucous membranes  NECK: Supple, No JVD  CNS: Motor Strength 5/5 B/L upper and lower extremities; DTRs 2+ intact   LUNG: Clear to auscultation bilaterally; No rales, rhonchi, wheezing, or rubs  HEART: RRR; No murmurs, rubs, or gallops  ABDOMEN: +BS, ST/ND/NT  GENITOURINARY- Voiding, Bladder not distended  EXTREMITIES:  2+ Peripheral Pulses, No clubbing, cyanosis, or edema  MUSCULOSKELETAL- Joints normal ROM, no Muscle or joint tenderness 36F w/ PMH of morbid obesity s/p sleeve gastrectomy and hiatal hernia repair w/ Dr. Kolb 10/18/17 presents with complaints of increased urinary urgency and frequency x 1wk. Reports that she's been feeling the urge to urinate more often than normal however, there is a marked decrease in urinary output; she would put out drops of urine per trip to the bathroom. Also reports darkening of urine. Decreased PO hydration overall.     : Pt seen and examined bedside. She is not tolerating her diet, spitting up everything she swallows. Denies dysuria/hematuria. She is also reporting some ear pain associated w/ congestion and coughing.    REVIEW OF SYSTEMS:    CONSTITUTIONAL: No weakness, fevers or chills  EYES/ENT: No visual changes;  No vertigo. + throat pain with swallowing.  NECK: No pain or stiffness  RESPIRATORY: No cough, wheezing, hemoptysis; No shortness of breath  CARDIOVASCULAR: No chest pain or palpitations  GASTROINTESTINAL: No abdominal or epigastric pain. No nausea, vomiting, or hematemesis; No diarrhea or constipation. No melena or hematochezia.  GENITOURINARY: No dysuria, frequency or hematuria  NEUROLOGICAL: No numbness or weakness  SKIN: No itching, burning, rashes, or lesions   All other review of systems is negative unless indicated above.    Vital Signs Last 24 Hrs  T(C): 37.4 (17 @ 12:30)  T(F): 99.3 (17 @ 12:30), Max: 99.3 (17 @ 12:30)  HR: 81 (17 @ 12:30) (74 - 105)  BP: 100/56 (17 @ 12:30)  BP(mean): --  RR: 16 (17 @ 12:30) (16 - 17)  SpO2: 98% (17 @ 12:30) (98% - 100%)  Wt(kg): --    PHYSICAL EXAM:    GENERAL: AOx3, NAD, well-groomed, well-developed  HEAD:  NC/AT  EYES: EOMI, PERRLA, no scleral icterus  HEENT: Moist mucous membranes  NECK: Supple, No JVD  CNS: Motor Strength 5/5 B/L upper and lower extremities; DTRs 2+ intact   LUNG: Clear to auscultation bilaterally; No rales, rhonchi, wheezing, or rubs  HEART: RRR; No murmurs, rubs, or gallops  ABDOMEN: +BS, ST/ND/NT  GENITOURINARY- Voiding, Bladder not distended  EXTREMITIES:  2+ Peripheral Pulses, No clubbing, cyanosis, or edema  MUSCULOSKELETAL- Joints normal ROM, no Muscle or joint tenderness    Lab Results:                                            11.3                  Neurophils% (auto):   83.0   ( @ 08:20):    17.6 )-----------(136          Lymphocytes% (auto):  10.0                                          34.5                   Eosinphils% (auto):   x        Manual%: Neutrophils x    ; Lymphocytes x    ; Eosinophils x    ; Bands%: 2.0  ; Blasts x         Differential:	[] Automated		[] Manual        140  |  110<H>  |  10  ----------------------------<  94  3.7   |  22  |  0.57    Ca    8.0<L>      2017 08:20    TPro  7.4  /  Alb  3.8  /  TBili  1.3<H>  /  DBili  x   /  AST  27  /  ALT  47  /  AlkPhos  68      PT/INR - ( 2017 17:05 )   PT: 12.9 sec;   INR: 1.19 ratio           Urinalysis Basic - ( 2017 17:42 )    Color: Yellow / Appearance: very cloudy / S.010 / pH: x  Gluc: x / Ketone: Moderate  / Bili: Small / Urobili: 8 mg/dL   Blood: x / Protein: 100 mg/dL / Nitrite: Negative   Leuk Esterase: Moderate / RBC: 11-25 /HPF / WBC >50   Sq Epi: x / Non Sq Epi: Few / Bacteria: Moderate    < from: CT Abdomen and Pelvis w/ Oral Cont and w/ IV Cont (17 @ 19:54) >  EXAM:  CT ABDOMEN AND PELVIS OC IC                            PROCEDURE DATE:  2017    IMPRESSION:     The appendix is not visualized. No secondary signs of appendicitis.    Mild decreased nephrogram, perinephric/ureteral stranding and urothelial   enhancement of the proximal collecting system on the right, which can be   seen in recently passed stone and/or urinary tract infection   (pyelonephritis/ureteritis). Recommend clinical correlation.    Additional findings as described.      < end of copied text >    < from: Xray Chest 2 Views PA/Lat (17 @ 22:45) >  EXAM:  CHEST P.A. LATERAL                            PROCEDURE DATE:  2017  IMPRESSION:  Clear lungs.      < end of copied text >

## 2017-11-27 NOTE — DISCHARGE NOTE ADULT - HOSPITAL COURSE
36F w/ PMH of morbid obesity s/p sleeve gastrectomy and hiatal hernia repair, vitamin D deficiency, GERD admitted with right-sided pyelonephritis. The patient improved with IV antibiotics - she has been afebrile, non-tachycardic, maintaining normal BPs since admission. She was also complaining of dysphagia on admission and was not able to swallow her food or pills. Her bariatric surgeon Dr. Melchor was consulted. The patient is stable for DC home today with antibiotics and plans for follow up with her PCP and bariatric surgeon. 36F w/ PMH of morbid obesity s/p sleeve gastrectomy and hiatal hernia repair, vitamin D deficiency, GERD admitted with right-sided pyelonephritis. The patient improved with IV antibiotics - she has been afebrile, non-tachycardic, maintaining normal BPs since admission. She was also complaining of dysphagia on admission and was not able to swallow her food or pills. Her bariatric surgeon Dr. Melchor was consulted. After receiving decadron for presumed esophageal swelling, the patient was able to tolerate liquids better. The patient is stable for DC home today with liquid suspension antibiotics and plans for follow up with her PCP and bariatric surgeon.

## 2017-11-27 NOTE — DISCHARGE NOTE ADULT - CARE PROVIDER_API CALL
Marc Melchor), Surgery  [address] [facility] [address] [place] [address]  Phone: [phone]  Fax: [phone]    Christine Winkler), Internal Medicine  [id number] [place] [address]  Phone: [phone]  Fax: [phone]

## 2017-11-27 NOTE — PROGRESS NOTE ADULT - ASSESSMENT
36F w/ PMH as above admitted with pyelonephritis. She has been afebrile since admission and tachycardia has now resolved. Patient is not tolerating her diet (likely result of recent sleeve gastrectomy) and will consult her bariatric surgeon for management of that problem.

## 2017-11-27 NOTE — DISCHARGE NOTE ADULT - SECONDARY DIAGNOSIS.
Dysphagia, unspecified type Gastroesophageal reflux disease, esophagitis presence not specified Vitamin D deficiency

## 2017-11-27 NOTE — H&P ADULT - PROBLEM SELECTOR PLAN 1
# Complicated pyelonephritis in the setting of chills/leukocytosis and ?passage of kidney stone; +UA and CT suggestive of pyelonephritis  - Continue IVF at 100cc/hr  - Rocephin 1g q24hr  - Morphine 2mg for severe pain  - f/u urine culture for speciation and sensitivities- ID consult as indicated  - Zofran PRN for N/V - EKG for baseline QTc  - Heparin subQ for DVT ppx # Complicated pyelonephritis in the setting of chills/leukocytosis and ?passage of kidney stone; +UA and CT suggestive of pyelonephritis  - Continue IVF at 100cc/hr  - Rocephin 1g q24hr  - Morphine 2mg for severe pain  - f/u urine culture for speciation and sensitivities- ID consult as indicated  - Strain Urine for stones  - Zofran PRN for N/V - EKG for baseline QTc  - Heparin subQ for DVT ppx

## 2017-11-27 NOTE — PATIENT PROFILE ADULT. - PMH
Gastritis    GERD (gastroesophageal reflux disease)    Hiatal hernia    Morbid obesity    Vitamin D deficiency

## 2017-11-27 NOTE — DISCHARGE NOTE ADULT - CARE PLAN
Principal Discharge DX:	Pyelonephritis  Goal:	Resolution of pain  Instructions for follow-up, activity and diet:	Continue antibiotics  Secondary Diagnosis:	Dysphagia, unspecified type  Instructions for follow-up, activity and diet:	Follow up with your bariatric surgeon within a week of discharge  Secondary Diagnosis:	Gastroesophageal reflux disease, esophagitis presence not specified  Instructions for follow-up, activity and diet:	Continue home meds - pantoprazole  Secondary Diagnosis:	Vitamin D deficiency  Instructions for follow-up, activity and diet:	Continue home meds - vitamin D supplements Principal Discharge DX:	Pyelonephritis  Goal:	Resolution of pain  Instructions for follow-up, activity and diet:	Continue antibiotics  Secondary Diagnosis:	Dysphagia, unspecified type  Instructions for follow-up, activity and diet:	Now improved after IV decadron.  Follow up with your bariatric surgeon within a week of discharge  Secondary Diagnosis:	Gastroesophageal reflux disease, esophagitis presence not specified  Instructions for follow-up, activity and diet:	Continue home meds - pantoprazole  Secondary Diagnosis:	Vitamin D deficiency  Instructions for follow-up, activity and diet:	Continue home meds - vitamin D supplements Principal Discharge DX:	Pyelonephritis  Goal:	Resolution of pain  Instructions for follow-up, activity and diet:	Continue oral antibiotics for another 8 days - cefdinir 300 mg twice a day.  Secondary Diagnosis:	Dysphagia, unspecified type  Instructions for follow-up, activity and diet:	Improved after IV decadron.  Follow up with your bariatric surgeon within a week of discharge  Secondary Diagnosis:	Gastroesophageal reflux disease, esophagitis presence not specified  Instructions for follow-up, activity and diet:	Continue home meds - pantoprazole  Secondary Diagnosis:	Vitamin D deficiency  Instructions for follow-up, activity and diet:	Continue home meds - vitamin D supplements

## 2017-11-27 NOTE — PROGRESS NOTE ADULT - PROBLEM SELECTOR PLAN 1
Improving with IVF and antibiotics, however continues to have leukocytosis to 17K.     Continue IV ceftriaxone  Trend WBC  No further urine straining required at this time

## 2017-11-27 NOTE — H&P ADULT - ATTENDING COMMENTS
Pt seen and examined with PGY 1, Family Medicine,  Dr. Andrea Broussard.  Presentation and assessment and plan reviewed in detail.   Agree with plan of care.  Pt is a 37yo F w/hx of morbid obesity s/p gastric sleeve (10/18/17), hiatal hernia s/p repair admitted w/ pyelonephritis and complicated UTI , possible renal calculi.  - cont Rocephin  - strain urine  - f/u cultures

## 2017-11-27 NOTE — H&P ADULT - FAMILY HISTORY
Family history of hypertension     Family history of diabetes mellitus     Family history of hyperlipidemia     Mother  Still living? Unknown  Family history of thyroid disease, Age at diagnosis: Age Unknown     Grandparent  Still living? Unknown  Family history of cerebrovascular accident (CVA) in grandmother, Age at diagnosis: Age Unknown     Aunt  Still living? Unknown  Family history of lupus erythematosus, Age at diagnosis: Age Unknown

## 2017-11-27 NOTE — H&P ADULT - HISTORY OF PRESENT ILLNESS
35yo F w/hx of morbid obesity s/p gastric sleeve (10/18/17), hiatal hernia s/p repair presents with complaints of increase urinary urgency and frequency x1wk. Reports that she's been feeling the urge to urinate more often than normal however, there is a marked decrease in urinary output; she would urinate drops 37yo F w/hx of morbid obesity s/p gastric sleeve (10/18/17), hiatal hernia s/p repair presents with complaints of increase urinary urgency and frequency x1wk. Reports that she's been feeling the urge to urinate more often than normal however, there is a marked decrease in urinary output; she would put out drops of urine per trip to the bathroom.  Denies vaginal pruritis or erythema. +brown substance when she wipes after urination. No concern for STI/STDs, no new sexual partners in the past year. 37yo F w/hx of morbid obesity s/p gastric sleeve (10/18/17), hiatal hernia s/p repair presents with complaints of increase urinary urgency and frequency x1wk. Reports that she's been feeling the urge to urinate more often than normal however, there is a marked decrease in urinary output; she would put out drops of urine per trip to the bathroom.  No alleviating or exacerbating factors. Denies vaginal pruritis or erythema. +brown substance when she wipes her vulva after urination; also reports darkening of urine. Adequate PO hydration per post gastric sleeve intake protocol. No concern for STI/STDs, no new sexual partners in the past year; no hx of abnormal paps/STIs/STDs. Denies fevers/weakness, vision changes, abdominal/flank/pelvic pain. +chills, HA, N/V, 37yo F w/hx of morbid obesity s/p gastric sleeve (10/18/17), hiatal hernia s/p repair presents with complaints of increased urinary urgency and frequency x1wk. Reports that she's been feeling the urge to urinate more often than normal however, there is a marked decrease in urinary output; she would put out drops of urine per trip to the bathroom.  No alleviating or exacerbating factors. Denies vaginal pruritis or erythema. +brown substance when she wipes her vulva after urination; also reports darkening of urine. Adequate PO hydration per post gastric sleeve intake protocol. No concern for STI/STDs, no new sexual partners in the past year; no hx of abnormal paps/STIs/STDs. Denies fevers/weakness, vision changes, abdominal/flank/pelvic pain. +chills, HA, N/V, 35yo F w/hx of morbid obesity s/p gastric sleeve (10/18/17), hiatal hernia s/p repair presents with complaints of increased urinary urgency and frequency x1wk. Reports that she's been feeling the urge to urinate more often than normal however, there is a marked decrease in urinary output; she would put out drops of urine per trip to the bathroom.  No alleviating or exacerbating factors. Denies vaginal pruritis or erythema. +brown substance when she wipes her vulva after urination; also reports darkening of urine. Decreased PO hydration overall. No concern for STI/STDs, no new sexual partners in the past year; no hx of abnormal paps/STIs/STDs/nephrolithiasis/UTIs. Denies fevers/weakness, vision changes, flank/pelvic pain. +chills, HA, N/V, RLQ and RUQ abdominal pain.

## 2017-11-27 NOTE — H&P ADULT - NSHPSOCIALHISTORY_GEN_ALL_CORE
Denies ETOH use  Former smoker: hx of 2ppd for 17years  Denies Illicit drug use In a monogamous relationship with her girlfriend  Denies ETOH use  Former smoker: hx of 2ppd for 17years  Denies Illicit drug use

## 2017-11-28 DIAGNOSIS — R51 HEADACHE: ICD-10-CM

## 2017-11-28 LAB
-  AMIKACIN: SIGNIFICANT CHANGE UP
-  AMPICILLIN/SULBACTAM: SIGNIFICANT CHANGE UP
-  AMPICILLIN: SIGNIFICANT CHANGE UP
-  AZTREONAM: SIGNIFICANT CHANGE UP
-  CEFAZOLIN: SIGNIFICANT CHANGE UP
-  CEFEPIME: SIGNIFICANT CHANGE UP
-  CEFOXITIN: SIGNIFICANT CHANGE UP
-  CEFTAZIDIME: SIGNIFICANT CHANGE UP
-  CEFTRIAXONE: SIGNIFICANT CHANGE UP
-  CIPROFLOXACIN: SIGNIFICANT CHANGE UP
-  ERTAPENEM: SIGNIFICANT CHANGE UP
-  GENTAMICIN: SIGNIFICANT CHANGE UP
-  IMIPENEM: SIGNIFICANT CHANGE UP
-  LEVOFLOXACIN: SIGNIFICANT CHANGE UP
-  MEROPENEM: SIGNIFICANT CHANGE UP
-  NITROFURANTOIN: SIGNIFICANT CHANGE UP
-  PIPERACILLIN/TAZOBACTAM: SIGNIFICANT CHANGE UP
-  TOBRAMYCIN: SIGNIFICANT CHANGE UP
-  TRIMETHOPRIM/SULFAMETHOXAZOLE: SIGNIFICANT CHANGE UP
ANION GAP SERPL CALC-SCNC: 9 MMOL/L — SIGNIFICANT CHANGE UP (ref 5–17)
BUN SERPL-MCNC: 10 MG/DL — SIGNIFICANT CHANGE UP (ref 7–23)
CALCIUM SERPL-MCNC: 7.9 MG/DL — LOW (ref 8.5–10.1)
CHLORIDE SERPL-SCNC: 111 MMOL/L — HIGH (ref 96–108)
CO2 SERPL-SCNC: 24 MMOL/L — SIGNIFICANT CHANGE UP (ref 22–31)
CREAT SERPL-MCNC: 0.46 MG/DL — LOW (ref 0.5–1.3)
CULTURE RESULTS: SIGNIFICANT CHANGE UP
GLUCOSE SERPL-MCNC: 83 MG/DL — SIGNIFICANT CHANGE UP (ref 70–99)
HCT VFR BLD CALC: 35 % — SIGNIFICANT CHANGE UP (ref 34.5–45)
HGB BLD-MCNC: 10.8 G/DL — LOW (ref 11.5–15.5)
MCHC RBC-ENTMCNC: 27.6 PG — SIGNIFICANT CHANGE UP (ref 27–34)
MCHC RBC-ENTMCNC: 30.9 GM/DL — LOW (ref 32–36)
MCV RBC AUTO: 89.2 FL — SIGNIFICANT CHANGE UP (ref 80–100)
METHOD TYPE: SIGNIFICANT CHANGE UP
ORGANISM # SPEC MICROSCOPIC CNT: SIGNIFICANT CHANGE UP
ORGANISM # SPEC MICROSCOPIC CNT: SIGNIFICANT CHANGE UP
PLATELET # BLD AUTO: 119 K/UL — LOW (ref 150–400)
POTASSIUM SERPL-MCNC: 3.8 MMOL/L — SIGNIFICANT CHANGE UP (ref 3.5–5.3)
POTASSIUM SERPL-SCNC: 3.8 MMOL/L — SIGNIFICANT CHANGE UP (ref 3.5–5.3)
RBC # BLD: 3.93 M/UL — SIGNIFICANT CHANGE UP (ref 3.8–5.2)
RBC # FLD: 13.9 % — SIGNIFICANT CHANGE UP (ref 10.3–14.5)
SODIUM SERPL-SCNC: 144 MMOL/L — SIGNIFICANT CHANGE UP (ref 135–145)
SPECIMEN SOURCE: SIGNIFICANT CHANGE UP
WBC # BLD: 11.4 K/UL — HIGH (ref 3.8–10.5)
WBC # FLD AUTO: 11.4 K/UL — HIGH (ref 3.8–10.5)

## 2017-11-28 RX ADMIN — CEFTRIAXONE 1000 MILLIGRAM(S): 500 INJECTION, POWDER, FOR SOLUTION INTRAMUSCULAR; INTRAVENOUS at 17:18

## 2017-11-28 RX ADMIN — SODIUM CHLORIDE 300 MILLILITER(S): 9 INJECTION, SOLUTION INTRAVENOUS at 05:30

## 2017-11-28 RX ADMIN — HEPARIN SODIUM 5000 UNIT(S): 5000 INJECTION INTRAVENOUS; SUBCUTANEOUS at 17:13

## 2017-11-28 RX ADMIN — HEPARIN SODIUM 5000 UNIT(S): 5000 INJECTION INTRAVENOUS; SUBCUTANEOUS at 05:28

## 2017-11-28 RX ADMIN — Medication 2 TABLET(S): at 14:08

## 2017-11-28 RX ADMIN — Medication 2 TABLET(S): at 11:59

## 2017-11-28 RX ADMIN — PANTOPRAZOLE SODIUM 40 MILLIGRAM(S): 20 TABLET, DELAYED RELEASE ORAL at 05:28

## 2017-11-28 RX ADMIN — MORPHINE SULFATE 2 MILLIGRAM(S): 50 CAPSULE, EXTENDED RELEASE ORAL at 05:35

## 2017-11-28 NOTE — PROGRESS NOTE ADULT - SUBJECTIVE AND OBJECTIVE BOX
36F w/ PMH of morbid obesity s/p sleeve gastrectomy and hiatal hernia repair w/ Dr. Kolb 10/18/17 presents with complaints of increased urinary urgency and frequency x 1wk. Reports that she's been feeling the urge to urinate more often than normal however, there is a marked decrease in urinary output; she would put out drops of urine per trip to the bathroom. Also reports darkening of urine. Decreased PO hydration overall.     : Pt seen and examined bedside. She is not tolerating her diet yet again today, having substernal burning chest pain after eating. Dr. Melchor, her GI surgeon will see her later tonight. Patient admits headache is resolving, and so are symptoms of dysuria. Urine returning to normal color.    REVIEW OF SYSTEMS:    CONSTITUTIONAL: No weakness, fevers or chills  EYES/ENT: No visual changes;  No vertigo. + throat pain with reflux.  NECK: No pain or stiffness  RESPIRATORY: No cough, wheezing, hemoptysis; No shortness of breath  CARDIOVASCULAR: No chest pain or palpitations  GASTROINTESTINAL: No abdominal or epigastric pain. No nausea, vomiting, or hematemesis; No diarrhea or constipation. No melena or hematochezia.  GENITOURINARY: No dysuria, frequency or hematuria  NEUROLOGICAL: No numbness or weakness  SKIN: No itching, burning, rashes, or lesions   All other review of systems is negative unless indicated above.    MEDICATIONS  (STANDING):  cefTRIAXone Injectable 1000 milliGRAM(s) IV Push every 24 hours  cholecalciferol 400 Unit(s) Oral daily  heparin  Injectable 5000 Unit(s) SubCutaneous every 12 hours  multivitamin 1 Tablet(s) Oral daily  oxymetazoline 0.05% Nasal Spray 1 Spray(s) Both Nostrils daily  pantoprazole    Tablet 40 milliGRAM(s) Oral before breakfast  sodium chloride 0.9% 1000 milliLiter(s) (300 mL/Hr) IV Continuous <Continuous>    MEDICATIONS  (PRN):  acetaminophen 325 mG/butalbital 50 mG/caffeine 40 mG 2 Tablet(s) Oral every 6 hours PRN prn headache.  morphine  - Injectable 2 milliGRAM(s) IV Push every 4 hours PRN Severe Pain  ondansetron Injectable 2 milliGRAM(s) IV Push every 6 hours PRN Nausea and/or Vomiting    PHYSICAL EXAM:    GENERAL: AOx3, NAD, well-groomed, well-developed  HEAD:  NC/AT  EYES: EOMI, PERRLA, no scleral icterus  HEENT: Moist mucous membranes  NECK: Supple, No JVD  CNS: Motor Strength 5/5 B/L upper and lower extremities; DTRs 2+ intact   LUNG: Clear to auscultation bilaterally; No rales, rhonchi, wheezing, or rubs  HEART: RRR; No murmurs, rubs, or gallops  ABDOMEN: +BS, ST/ND/NT  GENITOURINARY- Voiding, Bladder not distended  EXTREMITIES:  2+ Peripheral Pulses, No clubbing, cyanosis, or edema  MUSCULOSKELETAL- Joints normal ROM, no Muscle or joint tenderness, minor CVA tenderness of R side    Lab Results:                          10.8   11.4  )-----------( 119      ( 2017 07:17 )             35.0       144  |  111<H>  |  10  ----------------------------<  83  3.8   |  24  |  0.46<L>    Ca    7.9<L>      2017 07:17    TPro  7.4  /  Alb  3.8  /  TBili  1.3<H>  /  DBili  x   /  AST  27  /  ALT  47  /  AlkPhos  68      PT/INR - ( 2017 17:05 )   PT: 12.9 sec;   INR: 1.19 ratio         Urinalysis Basic - ( 2017 17:42 )    Color: Yellow / Appearance: very cloudy / S.010 / pH: x  Gluc: x / Ketone: Moderate  / Bili: Small / Urobili: 8 mg/dL   Blood: x / Protein: 100 mg/dL / Nitrite: Negative   Leuk Esterase: Moderate / RBC: 11-25 /HPF / WBC >50   Sq Epi: x / Non Sq Epi: Few / Bacteria: Moderate    < from: CT Abdomen and Pelvis w/ Oral Cont and w/ IV Cont (17 @ 19:54) >  EXAM:  CT ABDOMEN AND PELVIS OC IC                            PROCEDURE DATE:  2017    IMPRESSION:     The appendix is not visualized. No secondary signs of appendicitis.    Mild decreased nephrogram, perinephric/ureteral stranding and urothelial   enhancement of the proximal collecting system on the right, which can be   seen in recently passed stone and/or urinary tract infection   (pyelonephritis/ureteritis). Recommend clinical correlation.    Additional findings as described.      < end of copied text >    < from: Xray Chest 2 Views PA/Lat (17 @ 22:45) >  EXAM:  CHEST P.A. LATERAL                            PROCEDURE DATE:  2017  IMPRESSION:  Clear lungs.      < end of copied text >

## 2017-11-29 LAB
ANION GAP SERPL CALC-SCNC: 7 MMOL/L — SIGNIFICANT CHANGE UP (ref 5–17)
BUN SERPL-MCNC: 6 MG/DL — LOW (ref 7–23)
CALCIUM SERPL-MCNC: 8 MG/DL — LOW (ref 8.5–10.1)
CHLORIDE SERPL-SCNC: 108 MMOL/L — SIGNIFICANT CHANGE UP (ref 96–108)
CO2 SERPL-SCNC: 25 MMOL/L — SIGNIFICANT CHANGE UP (ref 22–31)
CREAT SERPL-MCNC: 0.48 MG/DL — LOW (ref 0.5–1.3)
GLUCOSE SERPL-MCNC: 77 MG/DL — SIGNIFICANT CHANGE UP (ref 70–99)
HCT VFR BLD CALC: 33.7 % — LOW (ref 34.5–45)
HGB BLD-MCNC: 10.6 G/DL — LOW (ref 11.5–15.5)
MCHC RBC-ENTMCNC: 27.5 PG — SIGNIFICANT CHANGE UP (ref 27–34)
MCHC RBC-ENTMCNC: 31.5 GM/DL — LOW (ref 32–36)
MCV RBC AUTO: 87.3 FL — SIGNIFICANT CHANGE UP (ref 80–100)
PLATELET # BLD AUTO: 126 K/UL — LOW (ref 150–400)
POTASSIUM SERPL-MCNC: 3.4 MMOL/L — LOW (ref 3.5–5.3)
POTASSIUM SERPL-SCNC: 3.4 MMOL/L — LOW (ref 3.5–5.3)
RBC # BLD: 3.86 M/UL — SIGNIFICANT CHANGE UP (ref 3.8–5.2)
RBC # FLD: 13.8 % — SIGNIFICANT CHANGE UP (ref 10.3–14.5)
SODIUM SERPL-SCNC: 140 MMOL/L — SIGNIFICANT CHANGE UP (ref 135–145)
WBC # BLD: 7.8 K/UL — SIGNIFICANT CHANGE UP (ref 3.8–10.5)
WBC # FLD AUTO: 7.8 K/UL — SIGNIFICANT CHANGE UP (ref 3.8–10.5)

## 2017-11-29 RX ORDER — URSODIOL 250 MG/1
300 TABLET, FILM COATED ORAL EVERY 8 HOURS
Qty: 0 | Refills: 0 | Status: DISCONTINUED | OUTPATIENT
Start: 2017-11-29 | End: 2017-11-29

## 2017-11-29 RX ORDER — URSODIOL 250 MG/1
300 TABLET, FILM COATED ORAL EVERY 8 HOURS
Qty: 0 | Refills: 0 | Status: DISCONTINUED | OUTPATIENT
Start: 2017-11-29 | End: 2017-12-01

## 2017-11-29 RX ORDER — METOCLOPRAMIDE HCL 10 MG
5 TABLET ORAL EVERY 6 HOURS
Qty: 0 | Refills: 0 | Status: DISCONTINUED | OUTPATIENT
Start: 2017-11-29 | End: 2017-12-01

## 2017-11-29 RX ORDER — DEXAMETHASONE 0.5 MG/5ML
10 ELIXIR ORAL DAILY
Qty: 0 | Refills: 0 | Status: COMPLETED | OUTPATIENT
Start: 2017-11-29 | End: 2017-11-30

## 2017-11-29 RX ADMIN — Medication 102 MILLIGRAM(S): at 20:32

## 2017-11-29 RX ADMIN — Medication 5 MILLIGRAM(S): at 22:59

## 2017-11-29 RX ADMIN — Medication 2 TABLET(S): at 05:31

## 2017-11-29 RX ADMIN — HEPARIN SODIUM 5000 UNIT(S): 5000 INJECTION INTRAVENOUS; SUBCUTANEOUS at 17:00

## 2017-11-29 RX ADMIN — Medication 2 TABLET(S): at 22:59

## 2017-11-29 RX ADMIN — PANTOPRAZOLE SODIUM 40 MILLIGRAM(S): 20 TABLET, DELAYED RELEASE ORAL at 05:31

## 2017-11-29 RX ADMIN — CEFTRIAXONE 1000 MILLIGRAM(S): 500 INJECTION, POWDER, FOR SOLUTION INTRAMUSCULAR; INTRAVENOUS at 17:01

## 2017-11-29 RX ADMIN — URSODIOL 300 MILLIGRAM(S): 250 TABLET, FILM COATED ORAL at 22:03

## 2017-11-29 RX ADMIN — Medication 2 TABLET(S): at 17:00

## 2017-11-29 RX ADMIN — SODIUM CHLORIDE 300 MILLILITER(S): 9 INJECTION, SOLUTION INTRAVENOUS at 05:32

## 2017-11-29 NOTE — PROGRESS NOTE ADULT - PROBLEM SELECTOR PLAN 1
Improving with IVF and antibiotics, however continues to have leukocytosis to 17K.     Continue IV ceftriaxone  Trend WBC  No further urine straining required at this time Improved with IVF and antibiotics, leukocytosis resolved. Had low grade fever this AM to 100.6.    Continue IV ceftriaxone  Ucx back - growing pan-sensitive e.coli  Will continue to monitor for now on IV abx and if patient spikes a fever again will consider CT A/P w/ IV contrast to r/o abscess

## 2017-11-29 NOTE — PROGRESS NOTE ADULT - SUBJECTIVE AND OBJECTIVE BOX
36F w/ PMH of morbid obesity s/p sleeve gastrectomy and hiatal hernia repair w/ Dr. Kolb 10/18/17 presents with complaints of increased urinary urgency and frequency x 1wk. Reports that she's been feeling the urge to urinate more often than normal however, there is a marked decrease in urinary output; she would put out drops of urine per trip to the bathroom. Also reports darkening of urine. Decreased PO hydration overall.     : Pt seen and examined bedside. She reports feeling well overall, however is unable to tolerate more than a few sips of liquid and can not keep down pills. She reports that she wants to see her bariatric surgeon and have her esophagus evaluated.    REVIEW OF SYSTEMS:    CONSTITUTIONAL: No weakness, fevers or chills  EYES/ENT: No visual changes;  No vertigo. + throat pain with reflux.  NECK: No pain or stiffness  RESPIRATORY: No cough, wheezing, hemoptysis; No shortness of breath  CARDIOVASCULAR: No chest pain or palpitations  GASTROINTESTINAL: No abdominal or epigastric pain. No nausea, vomiting, or hematemesis; No diarrhea or constipation. No melena or hematochezia.  GENITOURINARY: No dysuria, frequency or hematuria  NEUROLOGICAL: No numbness or weakness  SKIN: No itching, burning, rashes, or lesions   All other review of systems is negative unless indicated above.    Vital Signs Last 24 Hrs  T(C): 38.1 (17 @ 05:25)  T(F): 100.6 (17 @ 05:25), Max: 100.6 (17 @ 05:25)  HR: 91 (17 @ 05:25) (70 - 91)  BP: 110/69 (17 @ 05:25)  BP(mean): --  RR: 18 (17 @ 05:25) (18 - 18)  SpO2: 96% (17 @ 05:25) (96% - 100%)  Wt(kg): --    PHYSICAL EXAM:    GENERAL: AOx3, NAD, well-groomed, well-developed  HEAD:  NC/AT  EYES: EOMI, PERRLA, no scleral icterus  HEENT: Moist mucous membranes  NECK: Supple, No JVD  CNS: Motor Strength 5/5 B/L upper and lower extremities; DTRs 2+ intact   LUNG: Clear to auscultation bilaterally; No rales, rhonchi, wheezing, or rubs  HEART: RRR; No murmurs, rubs, or gallops  ABDOMEN: soft, non-tender, non-distended  GENITOURINARY- Voiding, Bladder not distended  EXTREMITIES:  2+ Peripheral Pulses, No clubbing, cyanosis, or edema  MUSCULOSKELETAL- Joints normal ROM, no Muscle or joint tenderness, minor CVA tenderness of R side    Lab Results:                          10.8   11.4  )-----------( 119      ( 2017 07:17 )             35.0       144  |  111<H>  |  10  ----------------------------<  83  3.8   |  24  |  0.46<L>    Ca    7.9<L>      2017 07:17    TPro  7.4  /  Alb  3.8  /  TBili  1.3<H>  /  DBili  x   /  AST  27  /  ALT  47  /  AlkPhos  68      PT/INR - ( 2017 17:05 )   PT: 12.9 sec;   INR: 1.19 ratio         Urinalysis Basic - ( 2017 17:42 )    Color: Yellow / Appearance: very cloudy / S.010 / pH: x  Gluc: x / Ketone: Moderate  / Bili: Small / Urobili: 8 mg/dL   Blood: x / Protein: 100 mg/dL / Nitrite: Negative   Leuk Esterase: Moderate / RBC: 11-25 /HPF / WBC >50   Sq Epi: x / Non Sq Epi: Few / Bacteria: Moderate    < from: CT Abdomen and Pelvis w/ Oral Cont and w/ IV Cont (17 @ 19:54) >  EXAM:  CT ABDOMEN AND PELVIS OC IC                            PROCEDURE DATE:  2017    IMPRESSION:     The appendix is not visualized. No secondary signs of appendicitis.    Mild decreased nephrogram, perinephric/ureteral stranding and urothelial   enhancement of the proximal collecting system on the right, which can be   seen in recently passed stone and/or urinary tract infection   (pyelonephritis/ureteritis). Recommend clinical correlation.    Additional findings as described.      < end of copied text >    < from: Xray Chest 2 Views PA/Lat (17 @ 22:45) >  EXAM:  CHEST P.A. LATERAL                            PROCEDURE DATE:  2017  IMPRESSION:  Clear lungs.      < end of copied text > 36F w/ PMH of morbid obesity s/p sleeve gastrectomy and hiatal hernia repair w/ Dr. Kolb 10/18/17 presents with complaints of increased urinary urgency and frequency x 1wk. Reports that she's been feeling the urge to urinate more often than normal however, there is a marked decrease in urinary output; she would put out drops of urine per trip to the bathroom. Also reports darkening of urine. Decreased PO hydration overall.     11/29: Pt seen and examined bedside. She reports feeling well overall, however is unable to tolerate more than a few sips of liquid and can not keep down pills. She reports that she wants to see her bariatric surgeon and have her esophagus evaluated.    REVIEW OF SYSTEMS:    CONSTITUTIONAL: No weakness, fevers or chills  EYES/ENT: No visual changes;  No vertigo. + throat pain with reflux.  NECK: No pain or stiffness  RESPIRATORY: No cough, wheezing, hemoptysis; No shortness of breath  CARDIOVASCULAR: No chest pain or palpitations  GASTROINTESTINAL: No abdominal or epigastric pain. No nausea, + vomiting ("spit up"), no hematemesis; No diarrhea or constipation. No melena or hematochezia.  GENITOURINARY: No dysuria, frequency or hematuria  NEUROLOGICAL: No numbness or weakness  SKIN: No itching, burning, rashes, or lesions   All other review of systems is negative unless indicated above.    Vital Signs Last 24 Hrs  T(C): 38.1 (11-29-17 @ 05:25)  T(F): 100.6 (11-29-17 @ 05:25), Max: 100.6 (11-29-17 @ 05:25)  HR: 91 (11-29-17 @ 05:25) (70 - 91)  BP: 110/69 (11-29-17 @ 05:25)  BP(mean): --  RR: 18 (11-29-17 @ 05:25) (18 - 18)  SpO2: 96% (11-29-17 @ 05:25) (96% - 100%)  Wt(kg): --    PHYSICAL EXAM:    GENERAL: AOx3, NAD, well-groomed, well-developed  HEAD:  NC/AT  EYES: EOMI, PERRLA, no scleral icterus  HEENT: Moist mucous membranes  NECK: Supple, No JVD  CNS: Motor Strength 5/5 B/L upper and lower extremities; DTRs 2+ intact   LUNG: Clear to auscultation bilaterally; No rales, rhonchi, wheezing, or rubs  HEART: RRR; No murmurs, rubs, or gallops  ABDOMEN: soft, non-tender, non-distended  GENITOURINARY- Voiding, Bladder not distended  EXTREMITIES:  2+ Peripheral Pulses, No clubbing, cyanosis, or edema  MUSCULOSKELETAL- Joints normal ROM, no Muscle or joint tenderness, minor CVA tenderness of R side    Lab Results:                                            10.6                  Neurophils% (auto):   x      (11-29 @ 07:14):    7.8  )-----------(126          Lymphocytes% (auto):  x                                             33.7                   Eosinphils% (auto):   x        Manual%: Neutrophils x    ; Lymphocytes x    ; Eosinophils x    ; Bands%: x    ; Blasts x         Differential:	[] Automated		[] Manual    11-29    140  |  108  |  6<L>  ----------------------------<  77  3.4<L>   |  25  |  0.48<L>    Ca    8.0<L>      29 Nov 2017 07:14    RADIOLOGY    < from: CT Abdomen and Pelvis w/ Oral Cont and w/ IV Cont (11.26.17 @ 19:54) >  EXAM:  CT ABDOMEN AND PELVIS OC IC                            PROCEDURE DATE:  11/26/2017    IMPRESSION:     The appendix is not visualized. No secondary signs of appendicitis.    Mild decreased nephrogram, perinephric/ureteral stranding and urothelial   enhancement of the proximal collecting system on the right, which can be   seen in recently passed stone and/or urinary tract infection   (pyelonephritis/ureteritis). Recommend clinical correlation.    Additional findings as described.      < end of copied text >    < from: Xray Chest 2 Views PA/Lat (11.26.17 @ 22:45) >  EXAM:  CHEST P.A. LATERAL                            PROCEDURE DATE:  11/26/2017  IMPRESSION:  Clear lungs.      < end of copied text >

## 2017-11-29 NOTE — PROGRESS NOTE ADULT - ASSESSMENT
36F w/ PMH as above admitted with pyelonephritis. She has been afebrile since admission and tachycardia has now resolved. Patient is not tolerating her diet (likely result of recent sleeve gastrectomy) and will consult her bariatric surgeon for management of that problem. 36F w/ PMH as above admitted with R pyelonephritis. She has been afebrile since admission and tachycardia has now resolved. Patient is not tolerating her diet (likely result of recent sleeve gastrectomy) and will consult her bariatric surgeon for management of that problem.

## 2017-11-30 LAB
ANION GAP SERPL CALC-SCNC: 8 MMOL/L — SIGNIFICANT CHANGE UP (ref 5–17)
BUN SERPL-MCNC: 7 MG/DL — SIGNIFICANT CHANGE UP (ref 7–23)
CALCIUM SERPL-MCNC: 8.5 MG/DL — SIGNIFICANT CHANGE UP (ref 8.5–10.1)
CHLORIDE SERPL-SCNC: 104 MMOL/L — SIGNIFICANT CHANGE UP (ref 96–108)
CO2 SERPL-SCNC: 26 MMOL/L — SIGNIFICANT CHANGE UP (ref 22–31)
CREAT SERPL-MCNC: 0.5 MG/DL — SIGNIFICANT CHANGE UP (ref 0.5–1.3)
GLUCOSE SERPL-MCNC: 126 MG/DL — HIGH (ref 70–99)
HCT VFR BLD CALC: 37.5 % — SIGNIFICANT CHANGE UP (ref 34.5–45)
HGB BLD-MCNC: 12.1 G/DL — SIGNIFICANT CHANGE UP (ref 11.5–15.5)
MCHC RBC-ENTMCNC: 28.2 PG — SIGNIFICANT CHANGE UP (ref 27–34)
MCHC RBC-ENTMCNC: 32.3 GM/DL — SIGNIFICANT CHANGE UP (ref 32–36)
MCV RBC AUTO: 87.1 FL — SIGNIFICANT CHANGE UP (ref 80–100)
PLATELET # BLD AUTO: 159 K/UL — SIGNIFICANT CHANGE UP (ref 150–400)
POTASSIUM SERPL-MCNC: 4 MMOL/L — SIGNIFICANT CHANGE UP (ref 3.5–5.3)
POTASSIUM SERPL-SCNC: 4 MMOL/L — SIGNIFICANT CHANGE UP (ref 3.5–5.3)
RBC # BLD: 4.31 M/UL — SIGNIFICANT CHANGE UP (ref 3.8–5.2)
RBC # FLD: 13.5 % — SIGNIFICANT CHANGE UP (ref 10.3–14.5)
SODIUM SERPL-SCNC: 138 MMOL/L — SIGNIFICANT CHANGE UP (ref 135–145)
WBC # BLD: 3.9 K/UL — SIGNIFICANT CHANGE UP (ref 3.8–10.5)
WBC # FLD AUTO: 3.9 K/UL — SIGNIFICANT CHANGE UP (ref 3.8–10.5)

## 2017-11-30 RX ORDER — CEFDINIR 250 MG/5ML
300 POWDER, FOR SUSPENSION ORAL
Qty: 0 | Refills: 0 | Status: DISCONTINUED | OUTPATIENT
Start: 2017-11-30 | End: 2017-12-01

## 2017-11-30 RX ORDER — URSODIOL 250 MG/1
1 TABLET, FILM COATED ORAL
Qty: 0 | Refills: 0 | COMMUNITY
Start: 2017-11-30

## 2017-11-30 RX ORDER — PANTOPRAZOLE SODIUM 20 MG/1
1 TABLET, DELAYED RELEASE ORAL
Qty: 0 | Refills: 0 | COMMUNITY
Start: 2017-11-30

## 2017-11-30 RX ADMIN — Medication 400 UNIT(S): at 11:44

## 2017-11-30 RX ADMIN — Medication 1 TABLET(S): at 11:44

## 2017-11-30 RX ADMIN — ONDANSETRON 2 MILLIGRAM(S): 8 TABLET, FILM COATED ORAL at 10:09

## 2017-11-30 RX ADMIN — URSODIOL 300 MILLIGRAM(S): 250 TABLET, FILM COATED ORAL at 14:51

## 2017-11-30 RX ADMIN — SODIUM CHLORIDE 300 MILLILITER(S): 9 INJECTION, SOLUTION INTRAVENOUS at 07:42

## 2017-11-30 RX ADMIN — Medication 102 MILLIGRAM(S): at 06:44

## 2017-11-30 RX ADMIN — Medication 2 TABLET(S): at 22:49

## 2017-11-30 RX ADMIN — URSODIOL 300 MILLIGRAM(S): 250 TABLET, FILM COATED ORAL at 06:38

## 2017-11-30 RX ADMIN — HEPARIN SODIUM 5000 UNIT(S): 5000 INJECTION INTRAVENOUS; SUBCUTANEOUS at 18:38

## 2017-11-30 RX ADMIN — PANTOPRAZOLE SODIUM 40 MILLIGRAM(S): 20 TABLET, DELAYED RELEASE ORAL at 06:38

## 2017-11-30 RX ADMIN — HEPARIN SODIUM 5000 UNIT(S): 5000 INJECTION INTRAVENOUS; SUBCUTANEOUS at 06:38

## 2017-11-30 RX ADMIN — CEFDINIR 300 MILLIGRAM(S): 250 POWDER, FOR SUSPENSION ORAL at 18:38

## 2017-11-30 RX ADMIN — URSODIOL 300 MILLIGRAM(S): 250 TABLET, FILM COATED ORAL at 22:49

## 2017-11-30 NOTE — PROGRESS NOTE ADULT - ASSESSMENT
36F w/ PMH as above admitted with R pyelonephritis. She has been afebrile since admission and tachycardia has now resolved. Patient is overall improved since admission, has been afebrile for >24 hours, is tolerating liquids well, and is OOB/ambulating. Discussed possible DC for today or tomorrow depending on whether she can tolerate oral antibiotics.

## 2017-11-30 NOTE — PROGRESS NOTE ADULT - SUBJECTIVE AND OBJECTIVE BOX
36F w/ PMH of morbid obesity s/p sleeve gastrectomy and hiatal hernia repair w/ Dr. Kolb 10/18/17 presents with complaints of increased urinary urgency and frequency x 1wk. Reports that she's been feeling the urge to urinate more often than normal however, there is a marked decrease in urinary output; she would put out drops of urine per trip to the bathroom. Also reports darkening of urine. Decreased PO hydration overall.     11/30: Pt seen and examined bedside. She reports feeling well overall, is still not able to keep down all her food, in particular solids. The "spitting up" is sometimes associated with throat pain. She is otherwise doing better, has been afebrile for >24 hours, is OOB/ambulating, urinating freely. Denying abd pain, dysuria, hematuria, etc.    REVIEW OF SYSTEMS:    CONSTITUTIONAL: No weakness, fevers or chills  EYES/ENT: No visual changes;  No vertigo. + throat pain with reflux.  NECK: No pain or stiffness  RESPIRATORY: No cough, wheezing, hemoptysis; No shortness of breath  CARDIOVASCULAR: No chest pain or palpitations  GASTROINTESTINAL: No abdominal or epigastric pain. No nausea, + vomiting ("spit up"), no hematemesis; No diarrhea or constipation. No melena or hematochezia.  GENITOURINARY: No dysuria, frequency or hematuria  NEUROLOGICAL: No numbness or weakness  SKIN: No itching, burning, rashes, or lesions   All other review of systems is negative unless indicated above.    Vital Signs Last 24 Hrs  T(C): 36.8 (11-30-17 @ 13:21)  T(F): 98.2 (11-30-17 @ 13:21), Max: 100.1 (11-29-17 @ 16:45)  HR: 64 (11-30-17 @ 13:21) (56 - 93)  BP: 113/57 (11-30-17 @ 13:21)  BP(mean): --  RR: 16 (11-30-17 @ 13:21) (16 - 18)  SpO2: 99% (11-30-17 @ 13:21) (99% - 100%)  Wt(kg): --      PHYSICAL EXAM:    GENERAL: AOx3, NAD, well-groomed, well-developed  HEAD:  NC/AT  EYES: EOMI, PERRLA, no scleral icterus  HEENT: Moist mucous membranes  NECK: Supple, No JVD  CNS: Motor Strength 5/5 B/L upper and lower extremities; DTRs 2+ intact   LUNG: Clear to auscultation bilaterally; No rales, rhonchi, wheezing, or rubs  HEART: RRR; No murmurs, rubs, or gallops  ABDOMEN: soft, non-tender, non-distended. No guarding/rigidity/rebound  GENITOURINARY- Voiding, Bladder not distended  EXTREMITIES:  2+ Peripheral Pulses, No clubbing, cyanosis, or edema  MUSCULOSKELETAL- Joints normal ROM, no Muscle or joint tenderness, minor CVA tenderness of R side    Lab Results:                                            12.1                  Neurophils% (auto):   x      (11-30 @ 07:22):    3.9  )-----------(159          Lymphocytes% (auto):  x                                             37.5                   Eosinphils% (auto):   x        Manual%: Neutrophils x    ; Lymphocytes x    ; Eosinophils x    ; Bands%: x    ; Blasts x         Differential:	[] Automated		[] Manual    11-30    138  |  104  |  7   ----------------------------<  126<H>  4.0   |  26  |  0.50    Ca    8.5      30 Nov 2017 07:22    RADIOLOGY    < from: CT Abdomen and Pelvis w/ Oral Cont and w/ IV Cont (11.26.17 @ 19:54) >  EXAM:  CT ABDOMEN AND PELVIS OC IC                            PROCEDURE DATE:  11/26/2017    IMPRESSION:     The appendix is not visualized. No secondary signs of appendicitis.    Mild decreased nephrogram, perinephric/ureteral stranding and urothelial   enhancement of the proximal collecting system on the right, which can be   seen in recently passed stone and/or urinary tract infection   (pyelonephritis/ureteritis). Recommend clinical correlation.    Additional findings as described.      < end of copied text >    < from: Xray Chest 2 Views PA/Lat (11.26.17 @ 22:45) >  EXAM:  CHEST P.A. LATERAL                            PROCEDURE DATE:  11/26/2017  IMPRESSION:  Clear lungs.      < end of copied text >

## 2017-11-30 NOTE — PROGRESS NOTE ADULT - ASSESSMENT
Laparoscopic sleeve gastrectomy with nausea and vomiting for last week.  Yesterday she received some Decadron  to decrease the swelling in her sleeve.    She is tolerating fluids and  some solid foods.  If she continues to tolerate food she can be discharged home and follow up with me as an outpatient.

## 2017-11-30 NOTE — PROGRESS NOTE ADULT - SUBJECTIVE AND OBJECTIVE BOX
the patient is very well known to me.  She is status post laparoscopic sleeve gastrectomy approximately six weeks ago.  She was admitted to the hospital for pyelonephritis  She has been nauseous and vomiting for the last couple weeks.   she received Decadron and anti-nausea medications.  This morning she feels a lot better and has not been vomiting.    Vital Signs Last 24 Hrs  T(C): 37.1 (30 Nov 2017 04:51), Max: 37.8 (29 Nov 2017 16:45)  T(F): 98.7 (30 Nov 2017 04:51), Max: 100.1 (29 Nov 2017 16:45)  HR: 59 (30 Nov 2017 04:51) (56 - 93)  BP: 110/72 (30 Nov 2017 04:51) (110/72 - 126/73)  BP(mean): --  RR: 18 (30 Nov 2017 04:51) (17 - 18)  SpO2: 100% (30 Nov 2017 04:51) (99% - 100%)    PHYSICAL EXAM:  General: NAD.  HEENT: no JVD, no jaundice.  LUNGS: CTAB.  Heart: S1 S2 RRR  Abd: soft nt/nd                             12.1   3.9   )-----------( 159      ( 30 Nov 2017 07:22 )             37.5       11-30    138  |  104  |  7   ----------------------------<  126<H>  4.0   |  26  |  0.50    Ca    8.5      30 Nov 2017 07:22

## 2017-11-30 NOTE — PROGRESS NOTE ADULT - PROBLEM SELECTOR PLAN 1
Improved with IVF and antibiotics, leukocytosis resolved. No more fevers.    DCed IV ceftriaxone  Started PO cefdinir liquid form 300 mg BID  Ucx back - growing pan-sensitive e.coli

## 2017-12-01 VITALS
SYSTOLIC BLOOD PRESSURE: 103 MMHG | HEART RATE: 59 BPM | OXYGEN SATURATION: 99 % | RESPIRATION RATE: 18 BRPM | DIASTOLIC BLOOD PRESSURE: 66 MMHG | TEMPERATURE: 98 F

## 2017-12-01 LAB
ANION GAP SERPL CALC-SCNC: 8 MMOL/L — SIGNIFICANT CHANGE UP (ref 5–17)
BUN SERPL-MCNC: 13 MG/DL — SIGNIFICANT CHANGE UP (ref 7–23)
CALCIUM SERPL-MCNC: 8.6 MG/DL — SIGNIFICANT CHANGE UP (ref 8.5–10.1)
CHLORIDE SERPL-SCNC: 106 MMOL/L — SIGNIFICANT CHANGE UP (ref 96–108)
CO2 SERPL-SCNC: 27 MMOL/L — SIGNIFICANT CHANGE UP (ref 22–31)
CREAT SERPL-MCNC: 0.74 MG/DL — SIGNIFICANT CHANGE UP (ref 0.5–1.3)
GLUCOSE SERPL-MCNC: 85 MG/DL — SIGNIFICANT CHANGE UP (ref 70–99)
HCT VFR BLD CALC: 36.6 % — SIGNIFICANT CHANGE UP (ref 34.5–45)
HGB BLD-MCNC: 11.6 G/DL — SIGNIFICANT CHANGE UP (ref 11.5–15.5)
MCHC RBC-ENTMCNC: 28.1 PG — SIGNIFICANT CHANGE UP (ref 27–34)
MCHC RBC-ENTMCNC: 31.8 GM/DL — LOW (ref 32–36)
MCV RBC AUTO: 88.4 FL — SIGNIFICANT CHANGE UP (ref 80–100)
PLATELET # BLD AUTO: 157 K/UL — SIGNIFICANT CHANGE UP (ref 150–400)
POTASSIUM SERPL-MCNC: 3.7 MMOL/L — SIGNIFICANT CHANGE UP (ref 3.5–5.3)
POTASSIUM SERPL-SCNC: 3.7 MMOL/L — SIGNIFICANT CHANGE UP (ref 3.5–5.3)
RBC # BLD: 4.14 M/UL — SIGNIFICANT CHANGE UP (ref 3.8–5.2)
RBC # FLD: 13.9 % — SIGNIFICANT CHANGE UP (ref 10.3–14.5)
SODIUM SERPL-SCNC: 141 MMOL/L — SIGNIFICANT CHANGE UP (ref 135–145)
WBC # BLD: 10.4 K/UL — SIGNIFICANT CHANGE UP (ref 3.8–10.5)
WBC # FLD AUTO: 10.4 K/UL — SIGNIFICANT CHANGE UP (ref 3.8–10.5)

## 2017-12-01 RX ORDER — CEFDINIR 250 MG/5ML
6 POWDER, FOR SUSPENSION ORAL
Qty: 100 | Refills: 0 | OUTPATIENT
Start: 2017-12-01 | End: 2017-12-09

## 2017-12-01 RX ORDER — PANTOPRAZOLE SODIUM 20 MG/1
1 TABLET, DELAYED RELEASE ORAL
Qty: 0 | Refills: 0 | COMMUNITY

## 2017-12-01 RX ADMIN — Medication 1 TABLET(S): at 13:13

## 2017-12-01 RX ADMIN — CEFDINIR 300 MILLIGRAM(S): 250 POWDER, FOR SUSPENSION ORAL at 09:11

## 2017-12-01 RX ADMIN — URSODIOL 300 MILLIGRAM(S): 250 TABLET, FILM COATED ORAL at 06:42

## 2017-12-01 RX ADMIN — Medication 400 UNIT(S): at 13:14

## 2017-12-01 RX ADMIN — ONDANSETRON 2 MILLIGRAM(S): 8 TABLET, FILM COATED ORAL at 07:43

## 2017-12-01 RX ADMIN — URSODIOL 300 MILLIGRAM(S): 250 TABLET, FILM COATED ORAL at 13:14

## 2017-12-01 RX ADMIN — PANTOPRAZOLE SODIUM 40 MILLIGRAM(S): 20 TABLET, DELAYED RELEASE ORAL at 06:42

## 2017-12-01 RX ADMIN — HEPARIN SODIUM 5000 UNIT(S): 5000 INJECTION INTRAVENOUS; SUBCUTANEOUS at 06:42

## 2017-12-01 RX ADMIN — MORPHINE SULFATE 2 MILLIGRAM(S): 50 CAPSULE, EXTENDED RELEASE ORAL at 10:53

## 2017-12-01 NOTE — PROGRESS NOTE ADULT - ASSESSMENT
36F w/ PMH as above admitted with R pyelonephritis. She has been afebrile since admission and tachycardia has now resolved. Patient is overall improved since admission, has been afebrile for >48 hours, is tolerating liquids well, and is OOB/ambulating. DC planning for today.

## 2017-12-01 NOTE — PROGRESS NOTE ADULT - ASSESSMENT
Laparoscopic sleeve gastrectomy with nausea and vomiting for last week.    Continue trial of regular food.    If she tolerates regular food the patient can be discharged home.

## 2017-12-01 NOTE — PROGRESS NOTE ADULT - SUBJECTIVE AND OBJECTIVE BOX
The patient  had one episode of vomiting yesterday for breakfast.  She did not have anything else to eat after that.  Currently she is  doing well.  She  has not vomited since.    Vital Signs Last 24 Hrs  T(C): 37.3 (01 Dec 2017 05:03), Max: 37.3 (01 Dec 2017 05:03)  T(F): 99.1 (01 Dec 2017 05:03), Max: 99.1 (01 Dec 2017 05:03)  HR: 56 (01 Dec 2017 05:03) (56 - 70)  BP: 104/59 (01 Dec 2017 05:03) (104/59 - 113/57)  BP(mean): --  RR: 18 (01 Dec 2017 05:03) (16 - 18)  SpO2: 100% (01 Dec 2017 05:03) (97% - 100%)    PHYSICAL EXAM:  General: NAD.  HEENT: no JVD, no jaundice.  LUNGS: CTAB.  Heart: S1 S2 RRR  Abd: soft nt/nd                           11.6   10.4  )-----------( 157      ( 01 Dec 2017 07:59 )             36.6       12-01    141  |  106  |  13  ----------------------------<  85  3.7   |  27  |  0.74    Ca    8.6      01 Dec 2017 07:59

## 2017-12-01 NOTE — PROGRESS NOTE ADULT - ATTENDING COMMENTS
Patient seen and examined with Dr. Darron Garg and Edward Kayserian and Madina Felipe on the Family Medicine Teaching Service.  Agree with history, physical, labs and plan which were reviewed in detail.
Patient seen and examined with Dr. Darron Garg on the Family Medicine Teaching Service.  Agree with history, physical, labs and plan which were reviewed in detail.
on exam- comfortable   -p/a-soft,bs+    A/P    #D/c today     #time spent more than 30 minutes
on exam- comfortable   -still having nausea and vomiting and did not tolerate much   -abdominal exam- soft, bs+    A/P    #Pyelonephritis- ct abx, if tolerates po then might d/c home on po abx with further management as an outpt     #time spent more than 30 minutes
Patient seen and examined with Dr. Volodymyr Carl, Madina Mills and Edward Kayserian on the Family Medicine Teaching Service.  Agree with history, physical, labs and plan which were reviewed in detail.

## 2017-12-01 NOTE — PROGRESS NOTE ADULT - PROBLEM SELECTOR PLAN 2
Continue pantoprazole

## 2017-12-01 NOTE — PROGRESS NOTE ADULT - PROBLEM SELECTOR PLAN 6
DVT ppx - continue HSQ  s/p bariatric surgery - restarted ursodiol

## 2017-12-01 NOTE — PROGRESS NOTE ADULT - PROBLEM SELECTOR PLAN 1
Improved with IVF and antibiotics, leukocytosis resolved. No more fevers.    DCed IV ceftriaxone  Started PO cefdinir liquid form 300 mg BID - patient is tolerating  Ucx back - growing pan-sensitive e.coli

## 2017-12-01 NOTE — PROGRESS NOTE ADULT - PROBLEM SELECTOR PLAN 3
Likely results of recent sleeve gastrectomy. Improved today, however still having difficulty with solids.    As per Dr. Melchor, pt received 2x decadron 10 mg. Also started ativan PRN and reglan for N/V.  Pt will follow up with Dr. Grossman as an outpatient for continued evaluation
Likely results of recent sleeve gastrectomy. Patient is unable to keep thin liquids or pills down at this time.    f/u surgery consult with Dr. Kolb
Likely results of recent sleeve gastrectomy. Improved today, however still having difficulty with solids.    As per Dr. Melchor, pt received 2x decadron 10 mg. Also started ativan PRN and reglan for N/V.  Pt will follow up with Dr. Grossman as an outpatient
Likely results of recent sleeve gastrectomy. Patient is unable to keep thin liquids or pills down at this time.    f/u surgery consult with Dr. Kolb
Likely results of recent sleeve gastrectomy. Patient is unable to keep thin liquids or pills down at this time.    f/u surgery consult with Dr. Kolb

## 2017-12-01 NOTE — PROGRESS NOTE ADULT - PROBLEM SELECTOR PROBLEM 2
Gastroesophageal reflux disease, esophagitis presence not specified

## 2017-12-01 NOTE — PROGRESS NOTE ADULT - PROVIDER SPECIALTY LIST ADULT
Bariatric Surgery
Bariatric Surgery
Family Medicine

## 2017-12-01 NOTE — PROGRESS NOTE ADULT - PROBLEM SELECTOR PROBLEM 3
Dysphagia, unspecified type

## 2017-12-01 NOTE — PROGRESS NOTE ADULT - SUBJECTIVE AND OBJECTIVE BOX
36F w/ PMH of morbid obesity s/p sleeve gastrectomy and hiatal hernia repair w/ Dr. Kolb 10/18/17 presents with complaints of increased urinary urgency and frequency x 1wk. Reports that she's been feeling the urge to urinate more often than normal however, there is a marked decrease in urinary output; she would put out drops of urine per trip to the bathroom. Also reports darkening of urine. Decreased PO hydration overall.     12/1: Pt seen and examined bedside. She reports feeling well overall, is still not able to keep down all her food, in particular solids. She is amenable to following up with her bariatric surgeon as an outpatient for continued evaluation of difficulty swallowing. She is able to tolerate liquids, including the liquid suspension of oral antibiotics that was started 11/30. She denies abdominal pain, chills, dysuria, hematuria, etc.    REVIEW OF SYSTEMS:    CONSTITUTIONAL: No weakness, fevers or chills  EYES/ENT: No visual changes;  No vertigo. + throat pain with reflux.  NECK: No pain or stiffness  RESPIRATORY: No cough, wheezing, hemoptysis; No shortness of breath  CARDIOVASCULAR: No chest pain or palpitations  GASTROINTESTINAL: No abdominal or epigastric pain. No nausea, + vomiting ("spit up"), no hematemesis; No diarrhea or constipation. No melena or hematochezia.  GENITOURINARY: No dysuria, frequency or hematuria  NEUROLOGICAL: No numbness or weakness  SKIN: No itching, burning, rashes, or lesions   All other review of systems is negative unless indicated above.    Vital Signs Last 24 Hrs  T(C): 36.4 (12-01-17 @ 13:03)  T(F): 97.6 (12-01-17 @ 13:03), Max: 99.1 (12-01-17 @ 05:03)  HR: 59 (12-01-17 @ 13:03) (56 - 70)  BP: 103/66 (12-01-17 @ 13:03)  BP(mean): --  RR: 18 (12-01-17 @ 13:03) (16 - 18)  SpO2: 99% (12-01-17 @ 13:03) (97% - 100%)  Wt(kg): --    11-30 @ 07:01  -  12-01 @ 07:00  --------------------------------------------------------  IN: 1000 mL / OUT: 0 mL / NET: 1000 mL      PHYSICAL EXAM:    GENERAL: AOx3, NAD, well-groomed, well-developed  HEAD:  NC/AT  EYES: EOMI, PERRLA, no scleral icterus  HEENT: Moist mucous membranes  NECK: Supple, No JVD  CNS: Motor Strength 5/5 B/L upper and lower extremities; DTRs 2+ intact   LUNG: Clear to auscultation bilaterally; No rales, rhonchi, wheezing, or rubs  HEART: RRR; No murmurs, rubs, or gallops  ABDOMEN: soft, non-tender, non-distended. No guarding/rigidity/rebound  GENITOURINARY- Voiding, Bladder not distended  EXTREMITIES:  2+ Peripheral Pulses, No clubbing, cyanosis, or edema  MUSCULOSKELETAL- Joints normal ROM, no Muscle or joint tenderness, minor CVA tenderness of R side    Lab Results:                                            11.6                  Neurophils% (auto):   x      (12-01 @ 07:59):    10.4 )-----------(157          Lymphocytes% (auto):  x                                             36.6                   Eosinphils% (auto):   x        Manual%: Neutrophils x    ; Lymphocytes x    ; Eosinophils x    ; Bands%: x    ; Blasts x         Differential:	[] Automated		[] Manual    12-01    141  |  106  |  13  ----------------------------<  85  3.7   |  27  |  0.74    Ca    8.6      01 Dec 2017 07:59    RADIOLOGY    < from: CT Abdomen and Pelvis w/ Oral Cont and w/ IV Cont (11.26.17 @ 19:54) >  EXAM:  CT ABDOMEN AND PELVIS OC IC                            PROCEDURE DATE:  11/26/2017    IMPRESSION:     The appendix is not visualized. No secondary signs of appendicitis.    Mild decreased nephrogram, perinephric/ureteral stranding and urothelial   enhancement of the proximal collecting system on the right, which can be   seen in recently passed stone and/or urinary tract infection   (pyelonephritis/ureteritis). Recommend clinical correlation.    Additional findings as described.      < end of copied text >    < from: Xray Chest 2 Views PA/Lat (11.26.17 @ 22:45) >  EXAM:  CHEST P.A. LATERAL                            PROCEDURE DATE:  11/26/2017  IMPRESSION:  Clear lungs.      < end of copied text >

## 2017-12-04 LAB
CULTURE RESULTS: SIGNIFICANT CHANGE UP
CULTURE RESULTS: SIGNIFICANT CHANGE UP
SPECIMEN SOURCE: SIGNIFICANT CHANGE UP
SPECIMEN SOURCE: SIGNIFICANT CHANGE UP

## 2017-12-06 ENCOUNTER — EMERGENCY (EMERGENCY)
Facility: HOSPITAL | Age: 37
LOS: 0 days | Discharge: HOME | End: 2017-12-07
Attending: EMERGENCY MEDICINE | Admitting: EMERGENCY MEDICINE
Payer: COMMERCIAL

## 2017-12-06 VITALS
SYSTOLIC BLOOD PRESSURE: 135 MMHG | OXYGEN SATURATION: 98 % | RESPIRATION RATE: 16 BRPM | DIASTOLIC BLOOD PRESSURE: 91 MMHG | TEMPERATURE: 98 F | HEART RATE: 75 BPM

## 2017-12-06 DIAGNOSIS — R13.19 OTHER DYSPHAGIA: ICD-10-CM

## 2017-12-06 DIAGNOSIS — K21.9 GASTRO-ESOPHAGEAL REFLUX DISEASE WITHOUT ESOPHAGITIS: ICD-10-CM

## 2017-12-06 DIAGNOSIS — N10 ACUTE PYELONEPHRITIS: ICD-10-CM

## 2017-12-06 DIAGNOSIS — Z87.19 PERSONAL HISTORY OF OTHER DISEASES OF THE DIGESTIVE SYSTEM: Chronic | ICD-10-CM

## 2017-12-06 DIAGNOSIS — K94.29 OTHER COMPLICATIONS OF GASTROSTOMY: ICD-10-CM

## 2017-12-06 DIAGNOSIS — Z98.84 BARIATRIC SURGERY STATUS: Chronic | ICD-10-CM

## 2017-12-06 DIAGNOSIS — E55.9 VITAMIN D DEFICIENCY, UNSPECIFIED: ICD-10-CM

## 2017-12-06 DIAGNOSIS — Z98.890 OTHER SPECIFIED POSTPROCEDURAL STATES: Chronic | ICD-10-CM

## 2017-12-06 DIAGNOSIS — R51 HEADACHE: ICD-10-CM

## 2017-12-06 DIAGNOSIS — Z98.84 BARIATRIC SURGERY STATUS: ICD-10-CM

## 2017-12-06 PROCEDURE — 99285 EMERGENCY DEPT VISIT HI MDM: CPT | Mod: 25

## 2017-12-06 RX ORDER — ONDANSETRON 8 MG/1
4 TABLET, FILM COATED ORAL ONCE
Qty: 0 | Refills: 0 | Status: COMPLETED | OUTPATIENT
Start: 2017-12-06 | End: 2017-12-06

## 2017-12-06 RX ORDER — SODIUM CHLORIDE 9 MG/ML
1000 INJECTION INTRAMUSCULAR; INTRAVENOUS; SUBCUTANEOUS ONCE
Qty: 0 | Refills: 0 | Status: COMPLETED | OUTPATIENT
Start: 2017-12-06 | End: 2017-12-06

## 2017-12-06 RX ORDER — MORPHINE SULFATE 50 MG/1
4 CAPSULE, EXTENDED RELEASE ORAL ONCE
Qty: 0 | Refills: 0 | Status: DISCONTINUED | OUTPATIENT
Start: 2017-12-06 | End: 2017-12-06

## 2017-12-06 NOTE — ED PROVIDER NOTE - PROGRESS NOTE DETAILS
d/w dr theodore, would like pt to have CT oral and IV contrast results reviewed. Pt with persistent inflammatory changes around right kidney, WBC improved, afebrile and urine with signs of infection. UCx reviewed-- Ecoli with multiple drug sensitivities.  Will change abx from the cephalosporin to bactrim and have pt f/u uro and pmd

## 2017-12-06 NOTE — ED PROVIDER NOTE - OBJECTIVE STATEMENT
37 yo female with h/o gastric sleeve and hiatal hernia repair with Dr theodore on 101/8 c/o  abdominal pain and vomiting since around 1pm, shortly after eating.  No diarrhea.  normal BM two days ago which is not unusual for her.  No fever.  No prior h/o similar pains.  Pt states she is supposed to be taking acid medication but ran out and hasn't had it for about 1 week.  +taking abx since discharge from  last week with UTI.  Still with some mild right flank pain which was with her admission last week.  No other surgical hx.

## 2017-12-06 NOTE — ED ADULT TRIAGE NOTE - CHIEF COMPLAINT QUOTE
vomiting and chills with abdominal pain. vomited 6 times today since 1pm. states she was discharged friday for bladder infection.

## 2017-12-07 LAB
ALBUMIN SERPL ELPH-MCNC: 3.4 G/DL — SIGNIFICANT CHANGE UP (ref 3.3–5)
ALP SERPL-CCNC: 81 U/L — SIGNIFICANT CHANGE UP (ref 40–120)
ALT FLD-CCNC: 63 U/L — SIGNIFICANT CHANGE UP (ref 12–78)
ANION GAP SERPL CALC-SCNC: 9 MMOL/L — SIGNIFICANT CHANGE UP (ref 5–17)
APPEARANCE UR: CLEAR — SIGNIFICANT CHANGE UP
AST SERPL-CCNC: 20 U/L — SIGNIFICANT CHANGE UP (ref 15–37)
BACTERIA # UR AUTO: (no result)
BASOPHILS # BLD AUTO: 0.1 K/UL — SIGNIFICANT CHANGE UP (ref 0–0.2)
BASOPHILS NFR BLD AUTO: 0.8 % — SIGNIFICANT CHANGE UP (ref 0–2)
BILIRUB SERPL-MCNC: 0.7 MG/DL — SIGNIFICANT CHANGE UP (ref 0.2–1.2)
BILIRUB UR-MCNC: NEGATIVE — SIGNIFICANT CHANGE UP
BUN SERPL-MCNC: 7 MG/DL — SIGNIFICANT CHANGE UP (ref 7–23)
CALCIUM SERPL-MCNC: 9.3 MG/DL — SIGNIFICANT CHANGE UP (ref 8.5–10.1)
CHLORIDE SERPL-SCNC: 105 MMOL/L — SIGNIFICANT CHANGE UP (ref 96–108)
CO2 SERPL-SCNC: 27 MMOL/L — SIGNIFICANT CHANGE UP (ref 22–31)
COLOR SPEC: YELLOW — SIGNIFICANT CHANGE UP
CREAT SERPL-MCNC: 0.74 MG/DL — SIGNIFICANT CHANGE UP (ref 0.5–1.3)
DIFF PNL FLD: (no result)
EOSINOPHIL # BLD AUTO: 0.1 K/UL — SIGNIFICANT CHANGE UP (ref 0–0.5)
EOSINOPHIL NFR BLD AUTO: 0.5 % — SIGNIFICANT CHANGE UP (ref 0–6)
GLUCOSE SERPL-MCNC: 102 MG/DL — HIGH (ref 70–99)
GLUCOSE UR QL: NEGATIVE MG/DL — SIGNIFICANT CHANGE UP
HCG SERPL-ACNC: <1 MIU/ML — SIGNIFICANT CHANGE UP
HCT VFR BLD CALC: 40.5 % — SIGNIFICANT CHANGE UP (ref 34.5–45)
HGB BLD-MCNC: 13.5 G/DL — SIGNIFICANT CHANGE UP (ref 11.5–15.5)
KETONES UR-MCNC: (no result)
LEUKOCYTE ESTERASE UR-ACNC: (no result)
LIDOCAIN IGE QN: 87 U/L — SIGNIFICANT CHANGE UP (ref 73–393)
LYMPHOCYTES # BLD AUTO: 2.1 K/UL — SIGNIFICANT CHANGE UP (ref 1–3.3)
LYMPHOCYTES # BLD AUTO: 20.8 % — SIGNIFICANT CHANGE UP (ref 13–44)
MAGNESIUM SERPL-MCNC: 2.3 MG/DL — SIGNIFICANT CHANGE UP (ref 1.6–2.6)
MCHC RBC-ENTMCNC: 29.1 PG — SIGNIFICANT CHANGE UP (ref 27–34)
MCHC RBC-ENTMCNC: 33.3 GM/DL — SIGNIFICANT CHANGE UP (ref 32–36)
MCV RBC AUTO: 87.3 FL — SIGNIFICANT CHANGE UP (ref 80–100)
MONOCYTES # BLD AUTO: 0.9 K/UL — SIGNIFICANT CHANGE UP (ref 0–0.9)
MONOCYTES NFR BLD AUTO: 9 % — SIGNIFICANT CHANGE UP (ref 2–14)
NEUTROPHILS # BLD AUTO: 6.9 K/UL — SIGNIFICANT CHANGE UP (ref 1.8–7.4)
NEUTROPHILS NFR BLD AUTO: 68.8 % — SIGNIFICANT CHANGE UP (ref 43–77)
NITRITE UR-MCNC: NEGATIVE — SIGNIFICANT CHANGE UP
PH UR: 7 — SIGNIFICANT CHANGE UP (ref 5–8)
PLATELET # BLD AUTO: 295 K/UL — SIGNIFICANT CHANGE UP (ref 150–400)
POTASSIUM SERPL-MCNC: 3.9 MMOL/L — SIGNIFICANT CHANGE UP (ref 3.5–5.3)
POTASSIUM SERPL-SCNC: 3.9 MMOL/L — SIGNIFICANT CHANGE UP (ref 3.5–5.3)
PROT SERPL-MCNC: 7.6 GM/DL — SIGNIFICANT CHANGE UP (ref 6–8.3)
PROT UR-MCNC: NEGATIVE MG/DL — SIGNIFICANT CHANGE UP
RBC # BLD: 4.63 M/UL — SIGNIFICANT CHANGE UP (ref 3.8–5.2)
RBC # FLD: 13.7 % — SIGNIFICANT CHANGE UP (ref 10.3–14.5)
RBC CASTS # UR COMP ASSIST: (no result) /HPF (ref 0–4)
SODIUM SERPL-SCNC: 141 MMOL/L — SIGNIFICANT CHANGE UP (ref 135–145)
SP GR SPEC: 1.01 — SIGNIFICANT CHANGE UP (ref 1.01–1.02)
UROBILINOGEN FLD QL: NEGATIVE MG/DL — SIGNIFICANT CHANGE UP
WBC # BLD: 10 K/UL — SIGNIFICANT CHANGE UP (ref 3.8–10.5)
WBC # FLD AUTO: 10 K/UL — SIGNIFICANT CHANGE UP (ref 3.8–10.5)
WBC UR QL: (no result)

## 2017-12-07 PROCEDURE — 74177 CT ABD & PELVIS W/CONTRAST: CPT | Mod: 26

## 2017-12-07 RX ORDER — AZTREONAM 2 G
1 VIAL (EA) INJECTION
Qty: 20 | Refills: 0 | OUTPATIENT
Start: 2017-12-07 | End: 2017-12-17

## 2017-12-07 RX ADMIN — MORPHINE SULFATE 4 MILLIGRAM(S): 50 CAPSULE, EXTENDED RELEASE ORAL at 00:00

## 2017-12-07 RX ADMIN — SODIUM CHLORIDE 1000 MILLILITER(S): 9 INJECTION INTRAMUSCULAR; INTRAVENOUS; SUBCUTANEOUS at 00:00

## 2017-12-07 RX ADMIN — ONDANSETRON 4 MILLIGRAM(S): 8 TABLET, FILM COATED ORAL at 00:00

## 2017-12-07 RX ADMIN — MORPHINE SULFATE 4 MILLIGRAM(S): 50 CAPSULE, EXTENDED RELEASE ORAL at 00:30

## 2017-12-07 RX ADMIN — Medication 1 TABLET(S): at 05:40

## 2017-12-07 NOTE — ED ADULT NURSE NOTE - OBJECTIVE STATEMENT
pt arrives to ED complaining of abdominal pain and nausea. pt has history of gastric sleeve done by MD Melchor. pt states gradual onset of symptoms. alert and oriented x 4. vss.

## 2017-12-08 ENCOUNTER — APPOINTMENT (OUTPATIENT)
Dept: INTERNAL MEDICINE | Facility: CLINIC | Age: 37
End: 2017-12-08

## 2017-12-08 DIAGNOSIS — R11.10 VOMITING, UNSPECIFIED: ICD-10-CM

## 2017-12-08 DIAGNOSIS — N12 TUBULO-INTERSTITIAL NEPHRITIS, NOT SPECIFIED AS ACUTE OR CHRONIC: ICD-10-CM

## 2018-09-05 NOTE — ED STATDOCS - DISCHARGE DATE
[facility]    Acute pain service Inpatient Progress Note    Patient Name: Antione Crum  :  1940  MRN:  [id number]        Acute Pain  Service Inpatient Progress Note:    Analgesia:Excellent  Pain Score:10  LOC: alert and awake  Resp Status: room air  Cardiac: VS stable  Side Effects:None  Catheter Site:clean, dressing intact and dry  Cath type: peripheral nerve cath(MOOG pump)  Infusion rate: 6ml/hr  Catheter Plan:Catheter to remain Insitu and Continue catheter infusion rate unchanged  Comments: Patient doing well.  He can move his toes and has minimal pain in his foot.                                              27-Sep-2017

## 2019-01-26 ENCOUNTER — EMERGENCY (EMERGENCY)
Facility: HOSPITAL | Age: 39
LOS: 0 days | Discharge: HOME | End: 2019-01-26
Attending: EMERGENCY MEDICINE
Payer: COMMERCIAL

## 2019-01-26 VITALS
SYSTOLIC BLOOD PRESSURE: 104 MMHG | TEMPERATURE: 98 F | RESPIRATION RATE: 17 BRPM | OXYGEN SATURATION: 100 % | HEART RATE: 72 BPM | DIASTOLIC BLOOD PRESSURE: 59 MMHG

## 2019-01-26 VITALS — HEIGHT: 64 IN | WEIGHT: 139.99 LBS

## 2019-01-26 DIAGNOSIS — S09.90XA UNSPECIFIED INJURY OF HEAD, INITIAL ENCOUNTER: ICD-10-CM

## 2019-01-26 DIAGNOSIS — S40.012A CONTUSION OF LEFT SHOULDER, INITIAL ENCOUNTER: ICD-10-CM

## 2019-01-26 DIAGNOSIS — Z98.84 BARIATRIC SURGERY STATUS: Chronic | ICD-10-CM
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
## 2019-01-26 DIAGNOSIS — S70.12XA CONTUSION OF LEFT THIGH, INITIAL ENCOUNTER: ICD-10-CM
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
## 2019-01-26 DIAGNOSIS — Z87.19 PERSONAL HISTORY OF OTHER DISEASES OF THE DIGESTIVE SYSTEM: Chronic | ICD-10-CM

## 2019-01-26 DIAGNOSIS — Z98.890 OTHER SPECIFIED POSTPROCEDURAL STATES: Chronic | ICD-10-CM

## 2019-01-26 DIAGNOSIS — S00.83XA CONTUSION OF OTHER PART OF HEAD, INITIAL ENCOUNTER: ICD-10-CM

## 2019-01-26 PROBLEM — K29.70 GASTRITIS, UNSPECIFIED, WITHOUT BLEEDING: Chronic | Status: ACTIVE | Noted: 2017-10-09

## 2019-01-26 PROBLEM — K21.9 GASTRO-ESOPHAGEAL REFLUX DISEASE WITHOUT ESOPHAGITIS: Chronic | Status: ACTIVE | Noted: 2017-10-09

## 2019-01-26 PROBLEM — E66.01 MORBID (SEVERE) OBESITY DUE TO EXCESS CALORIES: Chronic | Status: ACTIVE | Noted: 2017-10-09

## 2019-01-26 PROBLEM — E55.9 VITAMIN D DEFICIENCY, UNSPECIFIED: Chronic | Status: ACTIVE | Noted: 2017-10-09

## 2019-01-26 PROBLEM — K44.9 DIAPHRAGMATIC HERNIA WITHOUT OBSTRUCTION OR GANGRENE: Chronic | Status: ACTIVE | Noted: 2017-11-27

## 2019-01-26 PROCEDURE — 70486 CT MAXILLOFACIAL W/O DYE: CPT | Mod: 26

## 2019-01-26 PROCEDURE — 73030 X-RAY EXAM OF SHOULDER: CPT | Mod: 26,LT

## 2019-01-26 PROCEDURE — 71046 X-RAY EXAM CHEST 2 VIEWS: CPT | Mod: 26

## 2019-01-26 PROCEDURE — 72125 CT NECK SPINE W/O DYE: CPT | Mod: 26

## 2019-01-26 PROCEDURE — 72170 X-RAY EXAM OF PELVIS: CPT | Mod: 26

## 2019-01-26 PROCEDURE — 70450 CT HEAD/BRAIN W/O DYE: CPT | Mod: 26

## 2019-01-26 PROCEDURE — 99284 EMERGENCY DEPT VISIT MOD MDM: CPT

## 2019-01-26 PROCEDURE — 76377 3D RENDER W/INTRP POSTPROCES: CPT | Mod: 26

## 2019-01-26 RX ORDER — ACETAMINOPHEN 500 MG
975 TABLET ORAL ONCE
Qty: 0 | Refills: 0 | Status: COMPLETED | OUTPATIENT
Start: 2019-01-26 | End: 2019-01-26

## 2019-01-26 RX ADMIN — Medication 975 MILLIGRAM(S): at 16:57

## 2019-01-26 NOTE — ED STATDOCS - NS ED ROS FT
Constitutional: No fever or chills  Eyes: No visual changes  HEENT: No throat pain  CV: +Upper CP.  Resp: No SOB no cough  GI: No abd pain, nausea or vomiting  : No dysuria  MSK: +Neck, Shoulder, Hip, Jaw pain.   Skin: Bruising. No rash  Neuro: +HA.

## 2019-01-26 NOTE — ED STATDOCS - PROGRESS NOTE DETAILS
signed Ernestina Collins PA-C Pt seen initially in intake by Dr Marc Alarcon. signed Ernestina Collins PA-C Pt seen initially in intake by Dr Marc Alarcon.  38F states she tripped and fell down about 6 stairs at home after she got home from work around 1:30am, +head injury, no LOC. Pt with obvious ecchymosis of forehead and nasal bridge, mild swelling, no blood in nares, EOMI, sclera white. Also 3cm ecchymosis left shoulder, 3cm ecchymosis left hip, 4cm ecchymosis anterior left thigh. Pt ambulates with ease unassisted in ED. Denies HA/N/V. No significant findings on xray- just sclerotic area on pelvic xray, informed pt to f/u with PMD though likely benign. No significant findings on CT. Pt denies any domestic abuse or violence concerns at home. No apparent concussion. outpt f/u PMD. return precautions given. Pt agrees with DC and plan of care.

## 2019-01-26 NOTE — ED STATDOCS - OBJECTIVE STATEMENT
39 y/o F with PMHx of GERD, Hiatal Hernia presenting to the ED s/p mechanical fall ~0130 this morning. Reports that she slipped and fell down six carpeted steps, hitting the front of her head. No LOC. Able to stand up and walk to the couch after fall. C/o HA, neck pain, left shoulder pain, left hip pain, upper CP, jaw pain, and facial bruising. No SOB, CP, abd pain, N/V/D. NKDA.

## 2019-01-26 NOTE — ED ADULT NURSE NOTE - NSIMPLEMENTINTERV_GEN_ALL_ED
Implemented All Fall Risk Interventions:  [place] to call system. Call bell, personal items and telephone within reach. Instruct patient to call for assistance. Room bathroom lighting operational. Non-slip footwear when patient is off stretcher. Physically safe environment: no spills, clutter or unnecessary equipment. Stretcher in lowest position, wheels locked, appropriate side rails in place. Provide visual cue, wrist band, yellow gown, etc. Monitor gait and stability. Monitor for mental status changes and reorient to person, place, and time. Review medications for side effects contributing to fall risk. Reinforce activity limits and safety measures with patient and family.

## 2019-01-26 NOTE — ED STATDOCS - NS_ ATTENDINGSCRIBEDETAILS _ED_A_ED_FT
I, Marc Alarcon MD,  performed the initial face to face bedside interview with this patient regarding history of present illness, review of symptoms and relevant past medical, social and family history.  I completed an independent physical examination.  I was the initial provider who evaluated this patient.  The history, relevant review of systems, past medical and surgical history, medical decision making, and physical examination was documented by the scribe in my presence and I attest to the accuracy of the documentation.

## 2019-01-26 NOTE — ED STATDOCS - MEDICAL DECISION MAKING DETAILS
39 y/o F presents to ED s/p slip and fall down six stairs last night. Pt did not lose consciousness. Able to walk. Now with significant HA, jaw pain, and bruising to face. Exam as noted. Will obtain CT, XR, pain control, and reassess.

## 2019-01-26 NOTE — ED STATDOCS - PHYSICAL EXAMINATION
Constitutional: NAD  AAOx3  Eyes: PERRLA EOMI  HENT: Significant hematoma and bruising to forehead and over nasal bridge. No TTP over maxillary sinus or mandible. Mild TTP angle of mandible. No nasal septal hematoma.  Mouth: MMM  Cardiac: regular rate   Resp: Lungs CTAB  GI: Abd s/nt/nd  MSK: TTP over lower cervical spine. No TTP thoracic or lumbar spine. No TTP of chest wall. Mild tenderness over right costochondral joints. FROM bilateral LE.  Neuro: CN2-12 intact  Skin: Mild bruising to the left upper thigh. Bruise to left anterior thigh. See HENT Exam. No rashes. Constitutional: NAD  AAOx3  Eyes: PERRLA EOMI  HENT: Significant hematoma and bruising to forehead and over nasal bridge. No TTP over maxillary sinus or mandible. Mild TTP angle of mandible. No nasal septal hematoma.  Mouth: MMM  Cardiac: regular rate normal peripheral pulses  Resp: Lungs CTAB  GI: Abd s/nt/nd  MSK: TTP over lower cervical spine. No TTP thoracic or lumbar spine. No TTP of chest wall. Mild tenderness over right costochondral joints. FROM bilateral LE/UE  Neuro: CN2-12 intact ambulating without issue  Skin: Mild bruising to the left upper thigh. Bruise to left anterior thigh. See HENT Exam. No rashes.

## 2019-01-26 NOTE — ED STATDOCS - CARE PLAN
Principal Discharge DX:	Facial contusion, initial encounter  Secondary Diagnosis:	Fall down stairs, initial encounter

## 2019-01-26 NOTE — ED ADULT TRIAGE NOTE - CHIEF COMPLAINT QUOTE
patient states she fell down 6 wood with carpeting stairs around 130 am, she did not lose consciousness and is not on any medications.  she is c/o left sided shoulder, flank and arm pain,  ecchymotic area on forehead.  and left pinky finger pain.  patient took 2 tylenol 2 hours ago without relief of symptoms

## 2019-01-26 NOTE — ED ADULT NURSE NOTE - OBJECTIVE STATEMENT
pt presents to ED s/p mechanical fall this morning at 130 AM. pt states she fell down 6 carpeted steps, denies loc, + head strike. c/o L shoulder, L hip/flank pain.

## 2020-02-11 NOTE — ED ADULT NURSE NOTE - GASTROINTESTINAL WDL
Medication sent to pharmacy  
Abdomen soft, nontender, nondistended, bowel sounds present in all 4 quadrants.

## 2020-10-23 NOTE — ED ADULT TRIAGE NOTE - HEIGHT IN CM
FYI  The patients lidocaine 5% was denied by the insurance company. The patient was informed and will either pay for the script out of pocket or look into getting lidocaine 4%. 162.56

## 2021-02-03 NOTE — DISCHARGE NOTE ADULT - MEDICATION SUMMARY - MEDICATIONS TO CHANGE
LOS: 27 days   Patient Care Team:  Francisco Gallagher MD as PCP - General (Family Medicine)    Chief Complaint:   CVAs of left temporal-occipital and right superior frontal gyrus  CAD s/p CABG  HLD  CHF  HTN  Pancreatic cyst    Subjective   She continues with left-sided weakness and little difficulty with vision to the left.  No changes.  She feels she is doing better in therapy.  Will fatigue and need to rest breaks in between therapy sessions     History taken from: patient    Objective     Vital Signs  Temp:  [97.2 °F (36.2 °C)-98 °F (36.7 °C)] 97.6 °F (36.4 °C)  Heart Rate:  [75-90] 75  Resp:  [16-18] 18  BP: (106-132)/(64-77) 132/64    Physical Exam  General: calm, in NAD  Neck: trachea midline  Cardio: well perfused distal extremities, regular radial pulse  Resp: normal WOB on RA, nonlabored.    Abdomen: NT/ND  Extremities:    No edema  No crepitus with range of motion of the left hip.  Neuro: dense left hemiparesis, flaccid tone  Left homonymous hemianopsia    Left inattention.  A&Ox4. 0/5 strength LUE/LLE.   Takes resistance on the right side.    Results from last 7 days   Lab Units 02/01/21  0737 01/29/21  0709   WBC 10*3/mm3 4.58 4.98   HEMOGLOBIN g/dL 10.9* 10.4*   HEMATOCRIT % 33.8* 33.5*   PLATELETS 10*3/mm3 164 197     Results from last 7 days   Lab Units 02/01/21  0737 01/29/21  0709   SODIUM mmol/L 138 138   POTASSIUM mmol/L 4.3 4.5   CHLORIDE mmol/L 100 101   CO2 mmol/L 29.6* 30.3*   BUN mg/dL 16 13   CREATININE mg/dL 0.60 0.66   CALCIUM mg/dL 9.2 9.3   GLUCOSE mg/dL 112* 108*             Ref. Range 1/8/2021 06:01   TSH Baseline Latest Ref Range: 0.270 - 4.200 uIU/mL 2.830   Vitamin B-12 Latest Ref Range: 211 - 946 pg/mL 381   25 Hydroxy, Vitamin D Latest Ref Range: 30.0 - 100.0 ng/ml 20.6 (L)     CT of the abdomen-January 16  CT Abdomen Pelvis With Contrast   Final Result       A cystic focus at the pancreatic head,  likely corresponds to the   ultrasound finding of concern, could be further  evaluated with   endoscopic ultrasound as indicated, interval follow-up also recommended   to exclude any possibility of a cystic neoplasm. The pancreas is   thinned. The main pancreatic duct shows a mildly prominent caliber, 4   mm.          US GALLBLADDER-  1/14/2021   IMPRESSION:  Small to moderate amount of gallbladder sludge with a few  possible tiny nonshadowing gallstones.  2. No wall thickening or pericholecystic fluid is seen. No sonographic  Garcia's sign is seen.  3. Fatty infiltration of the liver.  4. 1.2 cm hypoechoic pancreatic head lesion. No pancreatic head lesions  are seen on the CT of the abdomen and pelvis from [facility]  dated 12/11/2019.  5. CT of the abdomen with contrast using pancreatic protocol is  recommended for further assessment.    EXAMINATION: LIMITED LEFT BREAST SONOGRAPHY-January 20, 2021     HISTORY: 84-year-old female status post recent placement of a loop  recorder on the left side of the chest with an area of palpable concern  in the left breast.     FINDINGS: Targeted sonographic evaluation of the area of palpable  concern in the left breast which corresponds to the upper inner quadrant  was performed. No sonographic abnormality is appreciated.     IMPRESSION:  Negative targeted left breast sonography. Clinical followup  is suggested.     BI-RADS Category 1: Negative  Results Review:     I reviewed the patient's new clinical results.    Medication Review: Complete  Scheduled Meds:Alirocumab, 75 mg, Subcutaneous, Q14 Days  aspirin, 81 mg, Oral, Daily  bisacodyl, 10 mg, Rectal, Q24H  [START ON 3/6/2021] cholecalciferol, 1,000 Units, Oral, Daily  dilTIAZem CD, 180 mg, Oral, BID  docusate sodium, 200 mg, Oral, BID  enoxaparin, 40 mg, Subcutaneous, Daily  ferrous sulfate, 325 mg, Oral, Daily With Breakfast  levothyroxine, 125 mcg, Oral, Q AM  lidocaine, 1 patch, Transdermal, Q24H  losartan, 50 mg, Oral, Daily  mirtazapine, 15 mg, Oral, Nightly  ondansetron ODT, 4 mg, Oral,  TID AC  oxybutynin, 5 mg, Oral, Daily With Dinner  pantoprazole, 40 mg, Oral, QAM  vitamin D, 50,000 Units, Oral, Q7 Days      Continuous Infusions:   PRN Meds:.HYDROcodone-acetaminophen  •  nitroglycerin  •  polyethylene glycol  •  traMADol      Assessment/Plan       Stroke (CMS/[facility])    Debility and functional deficits  -PT/OT/SLP     Acute CVA  -[place] 3 MRI at [facility] revealed acute infarct in the left temporal-occipital region, no TPA or MT  -Jan 4 had worsening of her NIHSS, MRI revealed a new right superior frontal gyrus acute infarct. Again outside of the window of TPA, not a candidate for mechanical thrombectomy  -continue ASA and Praluent as she is intolerant of statins  -Echo was unremarkable  -Holter normal  -Loop recorder placed  -A1c 5.0  -TSH and B12 level unremarkable    Loss of appetite  Weight loss  Nausea  -symptoms for approximately 8 months, has lost ~40lbs over that time per patient  -started Remeron 7.5 qHS  -GI consulted, appreciate recs. Started scheduled Zofran with meals on 1/13, continue Remeron.   Jan 14 - GI to check RUQ US, increase bowel regimen, continue Remeron and Zofran as above  January 15 -1.2 cm hypoechoic pancreatic head lesion on ultrasound.  To get CT of the abdomen tomorrow.  January 16 - CT scan showed pancreatic cyst.  Not felt to be the etiology of her nausea.  Follow-up as an outpatient.  January 17- Remeron increased to 15 mg nightly     Left breast nodule  -2x1cm nodule left breast at 10 o'clock position, borders feel smooth, nodule is not freely mobile, no overlying skin changes. Away from the site of recent left loop recorder placement Jan 5, 2021  -Last mammogram one year ago. May not tolerate mammogram with recent loop recorder placement. Patient discussed with Dr. Pride, left breast ultrasound ordered  -Left breast ultrasound negative at the area of interest in the left upper inner quadrant    HLD  -continue ASA and Praluent as she is intolerant of  statins     HTN  -continue cardizem, losartan  -normotensive goal  -monitor    Vit D insufficiency  -level 20 on admission  -started vitamin D replacement     Hypothyroidism  -continue levothyroxine    CAD  -continue ASA and Praluent   January 27-She had some sharp chest pain that was brief last evening at about 1 AM, resolved on its own.  Had nitroglycerin ordered but did not receive it.  She reports at home she did occasionally have chest pain and would take him to glycerin at home, stating that sometimes she would take it as she just felt nervous and worried with the chest pain.  She did feel some palpitations after the chest pain last evening.  We discussed seeing if anything was uploaded on her loop recorder to her cardiologist office and we will check those results.    Obstructive sleep apnea-January 27-she has been on 2 L nasal cannula oxygen at nighttime with sats 100%.  She did not have her CPAP brought in from home     Prerenal OBED, resolved  -secondary to decreased PO intake  -resolved prior to discharge from [facility]  -monitor electrolytes     Iron deficiency anemia  -received 2 doses of IV iron as well as 1UpRBCs on 1/4 at [facility]  -Hgb stable ~8 prior to discharge from [facility], 9.2 on admission to [facility]  -continue oral iron, monitor     Left hip pain  -xrays at [facility] unremarkable  Jan 13 - Fatigue continues to be an issue. She takes Norco 7.5 mg about 4 times a day with history of left hip pain-history of left femur fracture open reduction internal fixation, x-ray at the outside hospital on January 6 showed hardware in place.  January 14-She does have fatigue during the day.  Will try a lower dose of Norco 5 mg rather than 7.5 mg.  Is on Remeron 7.5 g at nighttime but that was only recently added.  January 15-she took Norco 5 mg twice a day at home.  More alert with better trunk control on lower dose of Norco today.  January 21-continues with left hip pain. Taking Norco 5 mg about 4 times a day. Add Lidoderm  patch.  January 25-does not feel Lidoderm patch that helpful.  Pain was better with addition of tramadol over the weekend.  Hip range of motion unremarkable with no crepitus  January 26-left hip xray did not show displacement of surgical hardware, fracture, erosion or dislocation  January 26-she feels tramadol works better for her than hydrocodone.  Anticipate going home on tramadol and discontinue hydrocodone.  She is presently taking tramadol twice a day.  Appears to be tolerating without any side effects.     Fluids/Electrolytes/Nutrition   -cardiac diet  -admission labs unremarkable     DVT ppx  -subQ Lovenox    /UTI  -on Ditropan 5 mg twice a day.  January 11 - voids with acceptable bladder scan postvoid residuals. Urinary tract infection with E. coli-sensitive to Macrobid  January 18 - required intermittent straight catheter 450 cc.  January 19 - incontinent and IC, PVRs 99-391cc  January 22 - has urge to urinate at times but cannot void, will decrease oxybutynin from bid to with dinner only. She is voiding with incontinence with postvoid residual 230-290 cc.  Discussed trial of decreasing Ditropan to 5 mg q. supper first from twice a day before doing a trial of Flomax.  January 23-urinary tract infection-E. coli-on cefdinir starting January 23.  Sensitive to cephalosporins  January 27-voids with postvoid residual  cc.  We will continue Ditropan at lower dose of 5 mg with supper      TEAM CONF - JAN 12 - BED MAX-DEP. TRANSFERS MAX 2 TRANSFER BOARD. NON-AMBULATORY. WHEELCHAIR 50 FEET MIN ASSIST. TOILET TRANSFERS DEP X 2. IMPAIRED VISION. BATH MAX. LBD DEP. UBD MAX. GROOMING MOD ASSIST. COGNITION - MILD TO MODERATE DEFICITS. 2L O2 AT HS. ON MACROBID FOR UTI. FATIGUES. HISTORY OF POOR APPETITE FOR MONTHS, WEIGHT LOSS 30-40 # OVER SEVERAL MONTHS AT HOME, HISTORY OF REPORTED NAUSEA AT HOME. WILL CONSULT GI.   TINY - 4 WEEKS.     TEAM CONF - JAN 19 - BED MAX 2 .  TRANSFERS MAX 2 TRANSFER BOARD. WHEELCHAIR  MIN A DUE TO VISIN DEFICITS. HAS SAT UNSUPPORTED FOR 30 SECS BEFORE STARTS TO LEAN. TOILET TRANSFERS DEP X 2. BATH UB MAX, LB DEP X 2. LBD DEP X 2. UBD MAX. GROOMING MOD. LEFT SHOULDER SUBLUXATION - ADD LEUKOTAPING. VOIDS WITH ELEVATED PVRS WITH OCCASIONAL ISC. CONTINENT INCONTINENT.  LEFT INATTENTION, LEFT VISUAL FIELD CUT. IMPAIRED ATTENTION, EXECUTIVE FUNCTION, REASONING. WORKING MEMORY IMPROVING.  BNE (Active)  Att'n. - WNL  Exec. Fx. - Mild Imp.  Rsng/Jgmnt - Mildly Imp. for abstract reaosning  Arith - Min Imp.  Visuospatial Skills - Mildly Imp.  Visual Mem. - Mildly Imp.  Verbal Mem. - Mildly Imp.  Emot - Pt endorsed dep related to current status  DEPRESSION - SUPPORTIVE THERAPY. ON REMERON.   ELOS -   3 WEEKS    TEAM CONF - JAN 26 -  BED MAX 2. TRANSFERS MAX 2. WHEELCHAIR 50 FEET MIN ASSIST, IMPACTED BY VISUAL DEFICITS. LEFT HIP PAIN INCREASED, WILL REPEAT X-RAY. AT OUTSIDE HOSPITAL X-RAY ON JAN 6 - [ORIF hardware in the proximal femur is stable from 5/6/2020. No hardware loosening or perihardware fracture is detected. The hip joint space is mildly narrowed as before. IMPRESSION: Stable ORIF hardware; no acute abnormality.]  TOILET TRANSFERS MAX 2 AS PUSHES. BATH UBB MIN, LBB MAX 2. UBD MAX ASSIST. LBD DEPENDENT. GROOMING MIN ASSIST. ATTENDS TO LEFT A LITTLE BETTER. TOILETING DEP X 2. LEFT VISUAL FIELD DEFICITS. EXECUTIVE FUNCTION - IMPAIRED ATTENTION. MEMORY MIN-MOD DEFICITS. MOD-MAX VISUAL CUES TO LEFT SIDE. VOIDS. ON DECREASED DITROPAN TO 5 MG ONCE A DAY DUE TO ELEVATED PVRS. CONTINENT AT TIMES WITH BLADDER. BOWEL CONTINENT. PAIN MANAGEMENT - TRAMADOL ADDED. WITH REC THERAPY, CUES TO ATTEND TO LEFT.  ELOS -  2-3 WEEKS    TEAM CONF- BED MAX 2. TRANSFERS MAX SQUAT PIVOT, USES LIFT WITH NURSING. SITTING BALANCE BETTER. LEFT INATTENTION. WHEELCHAIR MIN ASSIST WITH VISUAL CUES, DUE TO LEFT VISUAL INATTENTION/LEFT VISUAL FIELD CUT. STANDING AT PARALLEL BARS WITH BLOCKING LEFT KNEE. TOILET TRANSFERS MAX 2 TO  DROPARM. BATH MIN UB AND MAX X 2 LB. DRESSING MOD-MAX UB AND DEP X 2 LB. GROOMING MIN-SBA. TOILETING COMES UP TO STAND A LITTLE BETTER, DEP X 2.   IMPAIRED ATTENTION. DISTRACTIBLE. FATIGUES QUICKLY WITH COGNITIVE TASKS. MIN-MOD MEMORY DEFICITS. MOD-MAX VERBAL AND VISUAL CUES FOR READING AND WRITING TASKS. INCONTINENT BOWEL AND BLADDER. PVRS LOW. CHRONIC LEFT HIP PAIN.   ELOS -  ONE WEEK, WILL NEED SIGNIFICANT ASSISTANCE AT HOME.    Azael Abdullahi MD  02/03/21  12:31 EST    During rounds, used appropriate personal protective equipment including mask and gloves.  Additional gown if indicated.  Mask used was standard procedure mask. Appropriate PPE was worn during the entire visit.  Hand hygiene was completed before and after.       I will SWITCH the dose or number of times a day I take the medications listed below when I get home from the hospital:  None

## 2021-10-15 NOTE — ED PROVIDER NOTE - PMH
Patient understands condition, prognosis and need for follow up care. Gastritis    GERD (gastroesophageal reflux disease)    Hiatal hernia    Morbid obesity    Vitamin D deficiency

## 2021-12-21 NOTE — H&P PST ADULT - NS PRO FEM REPRO HEALTH SCREEN
[Bedroom] : not in the bedroom [Basement] : not in the basement [Living Area] : not in the living area [Smokers in Household] : there are no smokers in the home mammogram

## 2021-12-22 NOTE — ED PROVIDER NOTE - NS ED MD DISPO DISCHARGE CCDA
[Discussed Risks and Advised to Quit Smoking] : Discussed risks and advised to quit smoking [Discussed Cessation Strategies] : cessation strategies were discussed [Declined] : Patient has declined cessation intervention Patient/Caregiver provided printed discharge information.

## 2023-01-01 NOTE — ED ADULT NURSE NOTE - BREATHING, MLM
Mucous membranes moist and pink without lesions; alveolar ridge smooth and edentulous; lip, palate and uvula with acceptable anatomic shape; normal tongue, frenulum and cheek exam; mandible size acceptable.
Spontaneous, unlabored and symmetrical

## 2024-03-01 ENCOUNTER — OUTPATIENT (OUTPATIENT)
Dept: OUTPATIENT SERVICES | Facility: HOSPITAL | Age: 44
LOS: 1 days | End: 2024-03-01
Payer: MEDICAID

## 2024-03-01 ENCOUNTER — APPOINTMENT (OUTPATIENT)
Dept: MAMMOGRAPHY | Facility: CLINIC | Age: 44
End: 2024-03-01
Payer: MEDICAID

## 2024-03-01 DIAGNOSIS — Z87.19 PERSONAL HISTORY OF OTHER DISEASES OF THE DIGESTIVE SYSTEM: Chronic | ICD-10-CM

## 2024-03-01 DIAGNOSIS — Z98.890 OTHER SPECIFIED POSTPROCEDURAL STATES: Chronic | ICD-10-CM

## 2024-03-01 DIAGNOSIS — Z00.8 ENCOUNTER FOR OTHER GENERAL EXAMINATION: ICD-10-CM

## 2024-03-01 DIAGNOSIS — Z98.84 BARIATRIC SURGERY STATUS: Chronic | ICD-10-CM

## 2024-03-01 PROCEDURE — 77067 SCR MAMMO BI INCL CAD: CPT | Mod: 26

## 2024-03-01 PROCEDURE — 77063 BREAST TOMOSYNTHESIS BI: CPT | Mod: 26

## 2024-03-01 PROCEDURE — 77063 BREAST TOMOSYNTHESIS BI: CPT

## 2024-03-01 PROCEDURE — 77067 SCR MAMMO BI INCL CAD: CPT

## 2024-03-19 NOTE — ED STATDOCS - NS HIV RISK FACTOR YES
Needs psa lab drawn before next appt. [phone].  NOHEMI Shah  
Private Vehicle
Declined

## 2024-05-22 NOTE — ASU PREOP CHECKLIST - IDENTIFICATION BAND VERIFIED
The skin at the access site was anesthetized. Using a micropuncture needle with ultrasound ([place]) the right internal jugular vein was succesfully accessed in an antegrade fashion over the guidewire, under fluoroscopic guidance using a INTRODUCER SHTH 6FR 50CM 11CM GRN HUB SNPFT DIL TBG GW. Ultrasound found the vessel was patent. A permanent recording was saved. done

## 2024-10-08 NOTE — DISCHARGE NOTE ADULT - DISCHARGE TO
October 8, 2024      [facility]  [address]  [place] [address]  Phone: [phone]  Fax: [phone]       Patient: Vikki Coombs   YOB: 2013  Date of Visit: 10/08/2024    To Whom It May Concern:    Flores Coombs  was at [facility] on 10/08/2024. The patient may return to work/school on 10/10/2024 with no restrictions. This letter also excuses 10/07/2024.  If you have any questions or concerns, or if I can be of further assistance, please do not hesitate to contact me.    Sincerely,    Royal Noyola II, DO      Home

## 2025-05-05 NOTE — ED ADULT TRIAGE NOTE - NS ED NOTE AC HIGH RISK COUNTRIES
Airway    Performed by: Torito Dixon DO  Authorized by: Torito Dixon DO    Final Airway Type:  Endotracheal airway  Final Endotracheal Airway*:  ETT  ETT Size (mm)*:  7.5  Cuff*:  Regular  Technique Used for Successful ETT Placement:  Direct laryngoscopy  Devices/Methods Used in Placement*:  Mask  Intubation Procedure*:  Preoxygenation, ETCO2, Atraumatic, Dentition Unchanged, Pharynx Clear, Rapid Sequence Intubation and Cricoid Pressure  Insertion Site:  Oral  Blade Type*:  Video Laryngoscope (pascal)  Blade Size*:  3  Cuff Volume (mL):  8  Secured at (cm)*:  22  Placement Verified by: auscultation and capnometry    Glottic View*:  3 - view of epiglottis only  Attempts*:  1  Number of Other Approaches Attempted:  0   Patient Identified, Procedure confirmed, Emergency equipment available and Safety protocols followed  Location:  OR  Urgency:  Elective  Difficult Airway: Yes    Indications for Airway Management:  Anesthesia  Spontaneous Ventilation: absent    Sedation Level:  Anesthetized  MILS Maintained Throughout: No    Mask Difficulty Assessment:  0 - not attempted  Start Time: 5/5/2025 1:44 PM   Very anterior with floppy epiglottis. Pascal mac 3 true grade 3 without cricoid, with cricoid grade 2B       No

## 2025-07-14 ENCOUNTER — OUTPATIENT (OUTPATIENT)
Dept: OUTPATIENT SERVICES | Facility: HOSPITAL | Age: 45
LOS: 1 days | End: 2025-07-14
Payer: MEDICAID

## 2025-07-14 VITALS
OXYGEN SATURATION: 100 % | HEIGHT: 64 IN | TEMPERATURE: 97 F | WEIGHT: 145.73 LBS | SYSTOLIC BLOOD PRESSURE: 97 MMHG | RESPIRATION RATE: 16 BRPM | HEART RATE: 78 BPM | DIASTOLIC BLOOD PRESSURE: 66 MMHG

## 2025-07-14 DIAGNOSIS — Z98.84 BARIATRIC SURGERY STATUS: Chronic | ICD-10-CM

## 2025-07-14 DIAGNOSIS — Z98.890 OTHER SPECIFIED POSTPROCEDURAL STATES: Chronic | ICD-10-CM

## 2025-07-14 DIAGNOSIS — Z01.818 ENCOUNTER FOR OTHER PREPROCEDURAL EXAMINATION: ICD-10-CM

## 2025-07-14 DIAGNOSIS — Z87.19 PERSONAL HISTORY OF OTHER DISEASES OF THE DIGESTIVE SYSTEM: Chronic | ICD-10-CM

## 2025-07-14 LAB
ALBUMIN SERPL ELPH-MCNC: 3.3 G/DL — SIGNIFICANT CHANGE UP (ref 3.3–5)
ALP SERPL-CCNC: 62 U/L — SIGNIFICANT CHANGE UP (ref 40–120)
ALT FLD-CCNC: 20 U/L — SIGNIFICANT CHANGE UP (ref 12–78)
ANION GAP SERPL CALC-SCNC: 4 MMOL/L — LOW (ref 5–17)
APTT BLD: 26.1 SEC — SIGNIFICANT CHANGE UP (ref 26.1–36.8)
AST SERPL-CCNC: 17 U/L — SIGNIFICANT CHANGE UP (ref 15–37)
BASOPHILS # BLD AUTO: 0.01 K/UL — SIGNIFICANT CHANGE UP (ref 0–0.2)
BASOPHILS NFR BLD AUTO: 0.1 % — SIGNIFICANT CHANGE UP (ref 0–2)
BILIRUB SERPL-MCNC: 0.6 MG/DL — SIGNIFICANT CHANGE UP (ref 0.2–1.2)
BLD GP AB SCN SERPL QL: SIGNIFICANT CHANGE UP
BUN SERPL-MCNC: 21 MG/DL — SIGNIFICANT CHANGE UP (ref 7–23)
CALCIUM SERPL-MCNC: 8.9 MG/DL — SIGNIFICANT CHANGE UP (ref 8.5–10.1)
CHLORIDE SERPL-SCNC: 108 MMOL/L — SIGNIFICANT CHANGE UP (ref 96–108)
CO2 SERPL-SCNC: 26 MMOL/L — SIGNIFICANT CHANGE UP (ref 22–31)
CREAT SERPL-MCNC: 0.8 MG/DL — SIGNIFICANT CHANGE UP (ref 0.5–1.3)
EGFR: 94 ML/MIN/1.73M2 — SIGNIFICANT CHANGE UP
EGFR: 94 ML/MIN/1.73M2 — SIGNIFICANT CHANGE UP
EOSINOPHIL # BLD AUTO: 0.06 K/UL — SIGNIFICANT CHANGE UP (ref 0–0.5)
EOSINOPHIL NFR BLD AUTO: 0.9 % — SIGNIFICANT CHANGE UP (ref 0–6)
GLUCOSE SERPL-MCNC: 117 MG/DL — HIGH (ref 70–99)
HCT VFR BLD CALC: 30.5 % — LOW (ref 34.5–45)
HGB BLD-MCNC: 8.6 G/DL — LOW (ref 11.5–15.5)
IMM GRANULOCYTES # BLD AUTO: 0.02 K/UL — SIGNIFICANT CHANGE UP (ref 0–0.07)
IMM GRANULOCYTES NFR BLD AUTO: 0.3 % — SIGNIFICANT CHANGE UP (ref 0–0.9)
INR BLD: 0.9 RATIO — SIGNIFICANT CHANGE UP (ref 0.85–1.16)
LYMPHOCYTES # BLD AUTO: 1.63 K/UL — SIGNIFICANT CHANGE UP (ref 1–3.3)
LYMPHOCYTES NFR BLD AUTO: 23.2 % — SIGNIFICANT CHANGE UP (ref 13–44)
MCHC RBC-ENTMCNC: 21.4 PG — LOW (ref 27–34)
MCHC RBC-ENTMCNC: 28.2 G/DL — LOW (ref 32–36)
MCV RBC AUTO: 75.9 FL — LOW (ref 80–100)
MONOCYTES # BLD AUTO: 0.77 K/UL — SIGNIFICANT CHANGE UP (ref 0–0.9)
MONOCYTES NFR BLD AUTO: 11 % — SIGNIFICANT CHANGE UP (ref 2–14)
NEUTROPHILS # BLD AUTO: 4.53 K/UL — SIGNIFICANT CHANGE UP (ref 1.8–7.4)
NEUTROPHILS NFR BLD AUTO: 64.5 % — SIGNIFICANT CHANGE UP (ref 43–77)
NRBC # BLD AUTO: 0 K/UL — SIGNIFICANT CHANGE UP (ref 0–0)
NRBC # FLD: 0 K/UL — SIGNIFICANT CHANGE UP (ref 0–0)
NRBC BLD AUTO-RTO: 0 /100 WBCS — SIGNIFICANT CHANGE UP (ref 0–0)
PLATELET # BLD AUTO: 267 K/UL — SIGNIFICANT CHANGE UP (ref 150–400)
PMV BLD: 10.9 FL — SIGNIFICANT CHANGE UP (ref 7–13)
POTASSIUM SERPL-MCNC: 4.6 MMOL/L — SIGNIFICANT CHANGE UP (ref 3.5–5.3)
POTASSIUM SERPL-SCNC: 4.6 MMOL/L — SIGNIFICANT CHANGE UP (ref 3.5–5.3)
PROT SERPL-MCNC: 6.6 GM/DL — SIGNIFICANT CHANGE UP (ref 6–8.3)
PROTHROM AB SERPL-ACNC: 10.6 SEC — SIGNIFICANT CHANGE UP (ref 9.9–13.4)
RBC # BLD: 4.02 M/UL — SIGNIFICANT CHANGE UP (ref 3.8–5.2)
RBC # FLD: 16.5 % — HIGH (ref 10.3–14.5)
SODIUM SERPL-SCNC: 138 MMOL/L — SIGNIFICANT CHANGE UP (ref 135–145)
WBC # BLD: 7.02 K/UL — SIGNIFICANT CHANGE UP (ref 3.8–10.5)
WBC # FLD AUTO: 7.02 K/UL — SIGNIFICANT CHANGE UP (ref 3.8–10.5)

## 2025-07-14 PROCEDURE — 85610 PROTHROMBIN TIME: CPT

## 2025-07-14 PROCEDURE — 86900 BLOOD TYPING SEROLOGIC ABO: CPT

## 2025-07-14 PROCEDURE — 85730 THROMBOPLASTIN TIME PARTIAL: CPT

## 2025-07-14 PROCEDURE — 71046 X-RAY EXAM CHEST 2 VIEWS: CPT

## 2025-07-14 PROCEDURE — 80053 COMPREHEN METABOLIC PANEL: CPT

## 2025-07-14 PROCEDURE — 86901 BLOOD TYPING SEROLOGIC RH(D): CPT

## 2025-07-14 PROCEDURE — 93005 ELECTROCARDIOGRAM TRACING: CPT

## 2025-07-14 PROCEDURE — 99214 OFFICE O/P EST MOD 30 MIN: CPT | Mod: 25

## 2025-07-14 PROCEDURE — 85025 COMPLETE CBC W/AUTO DIFF WBC: CPT

## 2025-07-14 PROCEDURE — 86803 HEPATITIS C AB TEST: CPT

## 2025-07-14 PROCEDURE — 86850 RBC ANTIBODY SCREEN: CPT

## 2025-07-14 PROCEDURE — 93010 ELECTROCARDIOGRAM REPORT: CPT

## 2025-07-14 PROCEDURE — 71046 X-RAY EXAM CHEST 2 VIEWS: CPT | Mod: 26

## 2025-07-14 PROCEDURE — 36415 COLL VENOUS BLD VENIPUNCTURE: CPT

## 2025-07-14 RX ORDER — ALPRAZOLAM 0.5 MG
1 TABLET, EXTENDED RELEASE 24 HR ORAL
Refills: 0 | DISCHARGE

## 2025-07-14 RX ORDER — AMPHETAMINE 15.7 MG/1
1 TABLET, ORALLY DISINTEGRATING ORAL
Refills: 0 | DISCHARGE

## 2025-07-14 RX ORDER — FERROUS SULFATE 137(45) MG
325 TABLET, EXTENDED RELEASE ORAL
Refills: 0 | DISCHARGE

## 2025-07-14 RX ORDER — MAGNESIUM OXIDE 400 MG
1 TABLET ORAL
Refills: 0 | DISCHARGE

## 2025-07-14 NOTE — H&P PST ADULT - MOUTH
Patient's HM shows they are overdue for A1C Test.  [employer] and  files searched without success. No results to attach to order nor HM updated. Called pt; No answer; LDVM in regards to having lab work complete and scheduling Diabetes Follow-up.
moist

## 2025-07-14 NOTE — H&P PST ADULT - NSICDXPASTSURGICALHX_GEN_ALL_CORE_FT
PAST SURGICAL HISTORY:  H/O hiatal hernia     History of esophagogastroduodenoscopy (EGD) 2017    S/P laparoscopic sleeve gastrectomy

## 2025-07-14 NOTE — H&P PST ADULT - NSICDXPASTMEDICALHX_GEN_ALL_CORE_FT
PAST MEDICAL HISTORY:  ADHD     Anxiety     Cholelithiases     Gastritis     GERD (gastroesophageal reflux disease)     Hiatal hernia     Leg cramps     Morbid obesity     Nephrolithiasis     Nicotine dependence     Vitamin D deficiency      PAST MEDICAL HISTORY:  ADHD     Anxiety     Cholelithiases     Gastritis     GERD (gastroesophageal reflux disease)     Hiatal hernia     History of Helicobacter infection     CONNIE (iron deficiency anemia)     Leg cramps     Morbid obesity     Nephrolithiasis     Nicotine dependence     Vitamin D deficiency

## 2025-07-14 NOTE — H&P PST ADULT - NSICDXFAMILYHX_GEN_ALL_CORE_FT
FAMILY HISTORY:  Family history of diabetes mellitus  Family history of hyperlipidemia  Family history of hypertension    Mother  Still living? Unknown  Family history of thyroid disease, Age at diagnosis: Age Unknown    Grandparent  Still living? Unknown  Family history of cerebrovascular accident (CVA) in grandmother, Age at diagnosis: Age Unknown    Aunt  Still living? Unknown  Family history of lupus erythematosus, Age at diagnosis: Age Unknown

## 2025-07-14 NOTE — H&P PST ADULT - HISTORY OF PRESENT ILLNESS
44 year old female PMH significant for nicotine dependence,  asthma ( controlled, never intubated as per pt.).  anxiety, ADHD, GERD, CONNIE, morbid obesity s/p sleeve gastrectomy; Pre-op diagnosis of calculus of gallbladder; c/o abdominal pain RUQ; denies nausea vomiting she presents to PST for planned laparoscopic cholecystectomy

## 2025-07-14 NOTE — H&P PST ADULT - ASSESSMENT
Plan:  1. PST instructions given ; NPO status/  instructions to be given by ASU   2. Pt instructed to take following meds on day of surgery:   3. Pt instructed to take routine evening medications unless indicated   4. Stop NSAIDS ( Aspirin Alev Motrin Mobic Diclofenac), herbal supplements , MVI , Vitamin fish oil 7 days prior to surgery  unless   directed by surgeon or cardiologist;   5. Medical Optimization  with    6. EZ wash instructions given & mupirocin instructions given  7. CBC BMP PTT, PT/INR T&S EKG  done          44 year old female PMH significant for nicotine dependence,  asthma ( controlled, never intubated as per pt.).  anxiety, ADHD, GERD, CONNIE, morbid obesity s/p sleeve gastrectomy; Pre-op diagnosis of calculus of gallbladder; c/o abdominal pain RUQ; denies nausea vomiting she presents to PST for planned laparoscopic cholecystectomy       Plan:  1. PST instructions given ; NPO status/  instructions to be given by ASU   2. Pt instructed to take following meds on day of surgery:   3. Pt instructed to take routine evening medications unless indicated   4. Stop NSAIDS ( Aspirin Alev Motrin Mobic Diclofenac), herbal supplements , MVI , Vitamin fish oil 7 days prior to surgery  unless   directed by surgeon or cardiologist;   5. Medical Optimization  with    6. EZ wash instructions given & mupirocin instructions given  7. CBC BMP PTT, PT/INR T&S EKG  done          44 year old female PMH significant for nicotine dependence,  asthma ( controlled, never intubated as per pt.).  anxiety, ADHD, GERD, CONNIE, morbid obesity s/p sleeve gastrectomy; Pre-op diagnosis of calculus of gallbladder; c/o abdominal pain RUQ; denies nausea vomiting she presents to PST for planned laparoscopic cholecystectomy       Plan:  1. PST instructions given ; NPO status/  instructions to be given by ASU   2. Pt instructed to take following meds on day of surgery: pantoprazole, alprazolam ( Instructed patient to notify anesthesia if taking on day of surgery ) and prn: albuterol   3. Pt instructed to take routine evening medications unless indicated   4. Stop NSAIDS ( Aspirin Alev Motrin Mobic Diclofenac), herbal supplements , MVI , Vitamin fish oil 7 days prior to surgery  unless   directed by surgeon or cardiologist;   5. Medical Optimization  with Dr Currie   6. EZ wash instructions given   7. CBC CMP PTT, PT/INR T&S EKG CXR  done   8. Urine for pregnancy on day of surgery

## 2025-07-15 DIAGNOSIS — Z01.818 ENCOUNTER FOR OTHER PREPROCEDURAL EXAMINATION: ICD-10-CM

## 2025-07-15 LAB
HCV AB S/CO SERPL IA: 0.14 S/CO — SIGNIFICANT CHANGE UP (ref 0–0.79)
HCV AB SERPL-IMP: SIGNIFICANT CHANGE UP

## 2025-09-09 ENCOUNTER — TRANSCRIPTION ENCOUNTER (OUTPATIENT)
Age: 45
End: 2025-09-09